# Patient Record
Sex: FEMALE | Race: WHITE | NOT HISPANIC OR LATINO | Employment: OTHER | URBAN - NONMETROPOLITAN AREA
[De-identification: names, ages, dates, MRNs, and addresses within clinical notes are randomized per-mention and may not be internally consistent; named-entity substitution may affect disease eponyms.]

---

## 2017-02-02 ENCOUNTER — OFFICE VISIT (OUTPATIENT)
Dept: CARDIOLOGY | Facility: CLINIC | Age: 77
End: 2017-02-02

## 2017-02-02 VITALS
BODY MASS INDEX: 21.05 KG/M2 | HEIGHT: 66 IN | SYSTOLIC BLOOD PRESSURE: 130 MMHG | DIASTOLIC BLOOD PRESSURE: 82 MMHG | WEIGHT: 131 LBS | HEART RATE: 52 BPM

## 2017-02-02 DIAGNOSIS — I73.9 CLAUDICATION OF BOTH LOWER EXTREMITIES (HCC): ICD-10-CM

## 2017-02-02 DIAGNOSIS — I25.10 CORONARY ARTERY DISEASE INVOLVING NATIVE CORONARY ARTERY OF NATIVE HEART WITHOUT ANGINA PECTORIS: Primary | ICD-10-CM

## 2017-02-02 DIAGNOSIS — R42 DIZZINESS: ICD-10-CM

## 2017-02-02 DIAGNOSIS — R09.89 BILATERAL CAROTID BRUITS: ICD-10-CM

## 2017-02-02 DIAGNOSIS — R20.0 NUMBNESS: ICD-10-CM

## 2017-02-02 DIAGNOSIS — I10 ESSENTIAL HYPERTENSION: ICD-10-CM

## 2017-02-02 DIAGNOSIS — Z79.899 MEDICATION MANAGEMENT: ICD-10-CM

## 2017-02-02 DIAGNOSIS — I73.9 PVD (PERIPHERAL VASCULAR DISEASE) (HCC): ICD-10-CM

## 2017-02-02 DIAGNOSIS — R68.89 ABNORMAL ANKLE BRACHIAL INDEX: ICD-10-CM

## 2017-02-02 DIAGNOSIS — I77.9 BILATERAL CAROTID ARTERY DISEASE (HCC): ICD-10-CM

## 2017-02-02 DIAGNOSIS — R20.9 COLD HANDS AND FEET: ICD-10-CM

## 2017-02-02 DIAGNOSIS — E78.00 HYPERCHOLESTEREMIA: ICD-10-CM

## 2017-02-02 PROCEDURE — 99213 OFFICE O/P EST LOW 20 MIN: CPT | Performed by: NURSE PRACTITIONER

## 2017-02-02 NOTE — PROGRESS NOTES
"Chief Complaint   Patient presents with   • Follow-up     Complaining of hands and feet turning purple and extremely cold. Went to podiatrist to get toenails clipped and podiatrist wanted her to have test to check blood flow in arms and legs. Wanted to check and see your recommendations first. Denies shortness of breath or palpitations. Doesn't need refills at this time. Labs per PCP in November. Had pneumonia at this time.    • Chest Pain     Occasional pressure. Thinks is related to indigestion because is releived with rolaid.        Subjective       Dinah Gaines is a 76 y.o. female history of ischemic heart disease diagnosed in 2011 when she underwent stenting of the right coronary artery. Her cardiac workup done in 2015 showed no evidence of ischemia. Today she returns and no new or worsening symptoms reported except cold hands and feet. She reports a \"circulation test\" scheduled for next week.     HPI         Cardiac History:    Past Surgical History   Procedure Laterality Date   • Tubal abdominal ligation     • Converted (historical) holter  07/18/2013     Holter- baseline sinus, lowest HR 43 bpm   • Converted (historical) holter  06/11/2014     Holter- borderline sinus, lowest HR 52bpm, frequent PVCs noted   • Cardiovascular stress test  05/07/2015     Stress 4 min 10 sec, 68% THR, 180/100, negative for ischemia   • Echo - converted  05/07/2015     Echo- EF 50-55%, RVSP 42 mmHg, mild MR   • Cardiac catheterization  04/04/2011     - 3.5x28 Promus in RCA. EF 45%       Current Outpatient Prescriptions   Medication Sig Dispense Refill   • citalopram (CeleXA) 20 MG tablet Take 20 mg by mouth daily.     • clonazePAM (KlonoPIN) 0.5 MG tablet Take 0.5 mg by mouth daily.     • cloNIDine (CATAPRES) 0.1 MG tablet Take 0.1 mg by mouth 2 (two) times a day.     • clopidogrel (PLAVIX) 75 MG tablet Take 75 mg by mouth daily.     • levothyroxine (SYNTHROID, LEVOTHROID) 75 MCG tablet Take 75 mcg by mouth daily.     • " lisinopril (PRINIVIL,ZESTRIL) 40 MG tablet TAKE ONE TABLET BY MOUTH EVERY DAY FOR BLOOD PRESSURE 90 tablet 3   • Multiple Vitamin (MULTI VITAMIN DAILY PO) Take  by mouth daily.     • Polyethylene Glycol 3350 granules daily.     • ranitidine (ZANTAC) 300 MG tablet TAKE ONE TABLET BY MOUTH AT BEDTIME 90 tablet 3   • simvastatin (ZOCOR) 40 MG tablet Take 40 mg by mouth every night.       No current facility-administered medications for this visit.        Review of patient's allergies indicates no known allergies.    Past Medical History   Diagnosis Date   • Abrasion of arm, left 09/2014   • Anxiety disorder    • Atherosclerotic heart disease    • CAD (coronary artery disease)      followed by Dr. Phelps for CAD 60% (R) internal and 3.8 ectasia of the ascending thoracic aorta.   • H/O tubal ligation    • History of MI (myocardial infarction) 2011     s/p Cardiac Cath s/p Stent placement x1 Dr. Alan   • Hyperlipidemia    • Hypertension    • Hypothyroidism    • Osteoarthritis    • Osteoporosis    • RLS (restless legs syndrome)        Social History     Social History   • Marital status:      Spouse name: N/A   • Number of children: N/A   • Years of education: N/A     Occupational History   • Not on file.     Social History Main Topics   • Smoking status: Former Smoker     Quit date: 2011   • Smokeless tobacco: Never Used   • Alcohol use No   • Drug use: No   • Sexual activity: Not on file     Other Topics Concern   • Not on file     Social History Narrative       Family History   Problem Relation Age of Onset   • No Known Problems Mother    • Asthma Father    • Heart attack Brother        Review of Systems   Constitutional: Negative for activity change, fatigue and fever.   HENT: Negative for congestion, nosebleeds and trouble swallowing.    Eyes: Negative for visual disturbance.   Respiratory: Positive for chest tightness (or slight pressure). Negative for cough, shortness of breath and wheezing.   "  Cardiovascular: Negative for chest pain.   Gastrointestinal: Negative for abdominal distention, abdominal pain, blood in stool, nausea (mild heartburn relieved with rolaid) and vomiting.   Endocrine: Negative for polydipsia, polyphagia and polyuria.   Genitourinary: Negative for difficulty urinating and hematuria.   Musculoskeletal: Negative for gait problem and myalgias.   Skin: Negative for color change.   Neurological: Positive for numbness. Negative for dizziness, tremors, syncope, facial asymmetry, speech difficulty, weakness, light-headedness and headaches.   Hematological: Bruises/bleeds easily.   Psychiatric/Behavioral: Negative for confusion and sleep disturbance. The patient is not nervous/anxious.        Diabetes- No  Thyroid-abnormal    Objective     Visit Vitals   • /82 (BP Location: Right arm)   • Pulse 52   • Ht 66\" (167.6 cm)   • Wt 131 lb (59.4 kg)   • BMI 21.14 kg/m2       Physical Exam   Constitutional: She is oriented to person, place, and time. She appears well-developed.   Eyes: Pupils are equal, round, and reactive to light.   Neck: Neck supple. No JVD present. Carotid bruit is present.   Cardiovascular: Normal rate, regular rhythm and S1 normal.    Murmur heard.   Systolic murmur is present with a grade of 2/6   Pulses:       Radial pulses are 2+ on the right side, and 2+ on the left side.   Slightly loud S2   Pulmonary/Chest: Effort normal and breath sounds normal.   Abdominal: Soft. Bowel sounds are normal.   Musculoskeletal: She exhibits no edema.   Neurological: She is alert and oriented to person, place, and time.   Skin: Skin is warm and dry.   Psychiatric: She has a normal mood and affect. Her behavior is normal. Judgment and thought content normal.   Vitals reviewed.        Procedures          Assessment/Plan      Dinah was seen today for follow-up and chest pain.    Diagnoses and all orders for this visit:    Coronary artery disease involving native coronary artery of native " "heart without angina pectoris    Essential hypertension    Hypercholesteremia    PVD (peripheral vascular disease)    Cold hands and feet      She has appointment to have \"circulation check\" of legs. At her next visit we will consider repeat carotid/subclavian ultrasound due to history of stenosis for which she had seen Dr. Phelps in the past. Currently, she denies any symptoms. Her vital signs are stable. No medication changes made. She follows with you for management of labs.            Electronically signed by JULITO Mittal,  February 2, 2017 12:34 PM  "

## 2017-02-16 ENCOUNTER — TELEPHONE (OUTPATIENT)
Dept: CARDIOLOGY | Facility: CLINIC | Age: 77
End: 2017-02-16

## 2017-02-16 DIAGNOSIS — R20.0 NUMBNESS: ICD-10-CM

## 2017-02-16 DIAGNOSIS — I73.9 CLAUDICATION OF BOTH LOWER EXTREMITIES (HCC): ICD-10-CM

## 2017-02-16 DIAGNOSIS — R68.89 ABNORMAL ANKLE BRACHIAL INDEX: ICD-10-CM

## 2017-02-16 DIAGNOSIS — Z79.899 MEDICATION MANAGEMENT: ICD-10-CM

## 2017-02-16 DIAGNOSIS — R09.89 BILATERAL CAROTID BRUITS: Primary | ICD-10-CM

## 2017-02-17 NOTE — TELEPHONE ENCOUNTER
I ordered BMP, CT of LE and carotid U/S  
Lets go ahead and do a CTA of pelvis and lower extremities. Also a carotid/subclavian ultrasound due to history of bypass. Also need BMP to assess renal function prior to CTA. If results are abnormal she needs to see Dr. Pehlps. If she agrees I will place orders.   
Patient brought copy of KILEY results. Patient wants you to review. In patient chart under media.   
Scheduled for March 1st 2017 at 3:00. Advised patient to be at hospital at 1:00 per [place]'s recommendations to get labs done first. Patient aware of appointment. Orders faxed.   
Spoke with patient. States she is willing to undergo CTA of pelvis and lower extremities and carotid/subclavian US.   
1

## 2017-03-06 ENCOUNTER — TELEPHONE (OUTPATIENT)
Dept: CARDIOLOGY | Facility: CLINIC | Age: 77
End: 2017-03-06

## 2017-03-06 DIAGNOSIS — R42 DIZZINESS: ICD-10-CM

## 2017-03-06 DIAGNOSIS — I73.9 PVD (PERIPHERAL VASCULAR DISEASE) WITH CLAUDICATION (HCC): Primary | ICD-10-CM

## 2017-03-06 DIAGNOSIS — I77.9 BILATERAL CAROTID ARTERY DISEASE (HCC): ICD-10-CM

## 2017-03-06 NOTE — TELEPHONE ENCOUNTER
Patient aware of recommendations and is willing to follow recommendations. Can you put in order for CTA of carotids and referral to Dr. Phelps? Thank you!

## 2017-03-06 NOTE — TELEPHONE ENCOUNTER
Patient called requesting results of US Carotid's and CTA results. They are scanned into patient's chart. Thank you.

## 2017-03-06 NOTE — TELEPHONE ENCOUNTER
Please inform of results. She appears to have about 60% carotid artery disease and may need CTA for confirmation. She appears to have stenosis of superficial femoral etc. I would recommend forward results to Dr. Phelps and make follow up appointment for her to see him for any further investigation and his treatment recommendations. Thanks.

## 2017-03-07 NOTE — TELEPHONE ENCOUNTER
Called to schedule appointment with Dr. Phelps and he is out of the [place] for the next few weeks but his partner, Dr. Heart, is available next week. Spoke with patient and she is fine with seeing Dr. Heart. Is this okay?

## 2017-03-07 NOTE — TELEPHONE ENCOUNTER
Appointment made for 03/14 at 2:15pm. Patient aware. Referral, office note, and CTA sent to Dr. Heart's office.

## 2017-03-28 ENCOUNTER — OUTSIDE FACILITY SERVICE (OUTPATIENT)
Dept: CARDIOLOGY | Facility: CLINIC | Age: 77
End: 2017-03-28

## 2017-03-28 PROCEDURE — 93306 TTE W/DOPPLER COMPLETE: CPT | Performed by: INTERNAL MEDICINE

## 2017-04-12 RX ORDER — CLONIDINE HYDROCHLORIDE 0.1 MG/1
TABLET ORAL
Qty: 180 TABLET | Refills: 3 | Status: SHIPPED | OUTPATIENT
Start: 2017-04-12 | End: 2017-11-28 | Stop reason: DRUGHIGH

## 2017-07-11 RX ORDER — LISINOPRIL 40 MG/1
TABLET ORAL
Qty: 90 TABLET | Refills: 0 | Status: SHIPPED | OUTPATIENT
Start: 2017-07-11 | End: 2017-11-28 | Stop reason: DRUGHIGH

## 2017-07-11 RX ORDER — RANITIDINE 300 MG/1
TABLET ORAL
Qty: 90 TABLET | Refills: 4 | OUTPATIENT
Start: 2017-07-11

## 2017-08-07 ENCOUNTER — OFFICE VISIT (OUTPATIENT)
Dept: CARDIOLOGY | Facility: CLINIC | Age: 77
End: 2017-08-07

## 2017-08-07 VITALS
SYSTOLIC BLOOD PRESSURE: 110 MMHG | BODY MASS INDEX: 21.05 KG/M2 | WEIGHT: 131 LBS | DIASTOLIC BLOOD PRESSURE: 72 MMHG | HEART RATE: 56 BPM | HEIGHT: 66 IN

## 2017-08-07 DIAGNOSIS — I73.9 PVD (PERIPHERAL VASCULAR DISEASE) (HCC): ICD-10-CM

## 2017-08-07 DIAGNOSIS — E78.00 HYPERCHOLESTEREMIA: ICD-10-CM

## 2017-08-07 DIAGNOSIS — I10 ESSENTIAL HYPERTENSION: ICD-10-CM

## 2017-08-07 DIAGNOSIS — R00.1 BRADYCARDIA: ICD-10-CM

## 2017-08-07 DIAGNOSIS — I65.21 STENOSIS OF RIGHT CAROTID ARTERY: ICD-10-CM

## 2017-08-07 DIAGNOSIS — I25.10 CORONARY ARTERY DISEASE INVOLVING NATIVE CORONARY ARTERY OF NATIVE HEART WITHOUT ANGINA PECTORIS: Primary | ICD-10-CM

## 2017-08-07 DIAGNOSIS — E03.9 ACQUIRED HYPOTHYROIDISM: ICD-10-CM

## 2017-08-07 DIAGNOSIS — R13.19 OTHER DYSPHAGIA: ICD-10-CM

## 2017-08-07 PROCEDURE — 99214 OFFICE O/P EST MOD 30 MIN: CPT | Performed by: NURSE PRACTITIONER

## 2017-08-07 NOTE — PROGRESS NOTES
"Chief Complaint   Patient presents with   • Follow-up     Denies chest pain or palpitations. Has had three stents in right leg. Had right CEA per Dr. Heart. Progress notes from Dr. Heart in patient chart. Says she is currently working with speech therapy for dysphagia. Doesn't need refills at this time. Labs per PCP about 4 months ago.    • Shortness of Breath     With exertion. Seems to be getting better.        Cardiac Complaints  none      Subjective   Dinah Gaines is a 77 y.o. female with a history of ischemic heart disease diagnosed in 2011 when she underwent stenting of the right coronary artery. Her cardiac workup done in 2015 showed no evidence of ischemia. At last visit she returned with no new or worsening symptoms but reported except cold hands and feet. She reported a \"circulation test\" scheduled by PCP. KILEY report was then brought to the office which was read as abnormal.  CTA of abdomen with runoff was then advised as well as carotid CTA.  CTA of the abdomen with runoff showed an 80% stenosis of right femoral which was then stented x 1 in the right iliac and angioplasty performed by Dr. Heart.  Her CTA of her neck showed about a significant 80% of LINWOOD and she then underwent CEA with patch.  She returns today for follow up and states while she was in the hospital she was having difficulty swallowing and speech therapy was consulted.  She is now on a thickened liquid diet and states both the therapy and thickened liquids has really helped and she is doing much better.  She will have a swallowing study at the end of her last 8 sessions.  She denies any chest pain or palpitations but does admit to some shortness of breath but reports it has improved. Recent echo done this spring showed no significant abnormalities and normal LV function.  She reports walking daily and is getting up to about 1/2 mile a day.           Cardiac History  Past Surgical History:   Procedure Laterality Date   • CARDIAC " CATHETERIZATION  04/04/2011    - 3.5x28 Promus in RCA. EF 45%   • CARDIOVASCULAR STRESS TEST  05/07/2015    Stress 4 min 10 sec, 68% THR, 180/100, negative for ischemia   • CAROTID ENDARTERECTOMY  03/31/2017    Right CEA with patch.   • CONVERTED (HISTORICAL) HOLTER  07/18/2013    Holter- baseline sinus, lowest HR 43 bpm   • CONVERTED (HISTORICAL) HOLTER  06/11/2014    Holter- borderline sinus, lowest HR 52bpm, frequent PVCs noted   • ECHO - CONVERTED  05/07/2015    Echo- EF 50-55%, RVSP 42 mmHg, mild MR   • ECHO - CONVERTED  03/28/2017    EF 60%, mild MR, RSVP 35   • OTHER SURGICAL HISTORY  03/01/2017    CTA with runoff:  3.6 cm AAA, right iliac 50-60%, R femoral 80% stenosis   • OTHER SURGICAL HISTORY  03/14/2017    CTA neck:  80% LINWOOD   • OTHER SURGICAL HISTORY  03/17/2017    Stent to right iliac and angioplasty   • TUBAL ABDOMINAL LIGATION     • US CAROTID UNILATERAL  03/01/2017    Conor:  60% LINWOOD       Current Outpatient Prescriptions   Medication Sig Dispense Refill   • citalopram (CeleXA) 20 MG tablet Take 20 mg by mouth daily.     • clonazePAM (KlonoPIN) 0.5 MG tablet Take 0.5 mg by mouth daily.     • CloNIDine (CATAPRES) 0.1 MG tablet TAKE ONE TABLET BY MOUTH TWO TIMES A  tablet 3   • clopidogrel (PLAVIX) 75 MG tablet Take 75 mg by mouth daily.     • levothyroxine (SYNTHROID, LEVOTHROID) 75 MCG tablet Take 75 mcg by mouth daily.     • lisinopril (PRINIVIL,ZESTRIL) 40 MG tablet TAKE ONE TABLET BY MOUTH EVERY DAY 90 tablet 0   • Multiple Vitamin (MULTI VITAMIN DAILY PO) Take  by mouth daily.     • Polyethylene Glycol 3350 granules daily.     • ranitidine (ZANTAC) 300 MG tablet TAKE ONE TABLET BY MOUTH AT BEDTIME 90 tablet 3   • simvastatin (ZOCOR) 40 MG tablet Take 40 mg by mouth every night.       No current facility-administered medications for this visit.        Review of patient's allergies indicates no known allergies.    Past Medical History:   Diagnosis Date   • Abrasion of arm, left 09/2014  "  • Anxiety disorder    • Atherosclerotic heart disease    • CAD (coronary artery disease)     followed by Dr. Phelps for CAD 60% (R) internal and 3.8 ectasia of the ascending thoracic aorta.   • H/O tubal ligation    • History of MI (myocardial infarction) 2011    s/p Cardiac Cath s/p Stent placement x1 Dr. Alan   • Hyperlipidemia    • Hypertension    • Hypothyroidism    • Osteoarthritis    • Osteoporosis    • RLS (restless legs syndrome)        Social History     Social History   • Marital status:      Spouse name: N/A   • Number of children: N/A   • Years of education: N/A     Occupational History   • Not on file.     Social History Main Topics   • Smoking status: Former Smoker     Quit date: 2011   • Smokeless tobacco: Never Used   • Alcohol use No   • Drug use: No   • Sexual activity: Not on file     Other Topics Concern   • Not on file     Social History Narrative       Family History   Problem Relation Age of Onset   • No Known Problems Mother    • Asthma Father    • Heart attack Brother        Review of Systems   Constitution: Negative for weakness and malaise/fatigue.   Cardiovascular: Negative for chest pain, dyspnea on exertion, irregular heartbeat and palpitations.   Respiratory: Positive for shortness of breath. Negative for wheezing.    Musculoskeletal: Negative for arthritis and back pain.   Gastrointestinal: Negative for anorexia, flatus and nausea.   Genitourinary: Negative for dysuria, hesitancy and nocturia.   Neurological: Negative for dizziness, focal weakness and light-headedness.   Psychiatric/Behavioral: Negative for altered mental status and depression.       DiabetesNo  Thyroidabnormal    Objective     /72  Pulse 56  Ht 66\" (167.6 cm)  Wt 131 lb (59.4 kg)  BMI 21.14 kg/m2    Physical Exam   Constitutional: She is oriented to person, place, and time. She appears well-developed and well-nourished.   HENT:   Head: Normocephalic and atraumatic.   Eyes: EOM are normal. " Pupils are equal, round, and reactive to light.   Neck: Normal range of motion. Neck supple.   Cardiovascular: Regular rhythm.  Bradycardia present.    Murmur heard.  Pulmonary/Chest: Effort normal and breath sounds normal.   Abdominal: Soft.   Musculoskeletal: Normal range of motion.   Neurological: She is alert and oriented to person, place, and time.   Skin: Skin is warm and dry.   Psychiatric: She has a normal mood and affect. Her behavior is normal.       Procedures    Assessment/Plan     HR is slightly bradycardic today which has been her normal for sometime.  She takes her clonidine daily which is most likely the cause.  BP is well controlled.  She denies any new concerns  and states she is now walking daily and has slowly been able to increase her activity level and shortness of breath has slowly improved.  She was encouraged to continue with gradually increasing her activity level.  She is going to continue with speech therapy as she does have dysphagia and has about 8 visits left and will then have repeat swallow study, patient encouraged to continue to have thickened liquids.  Labs with you, could we get next copy?  No refills are needed at present.  She was encouraged to continue use of plavix as no bleeding reported and she has recently undergone stenting of right iliac artery and CEA of right carotid with patch.  Good cardiac diet as well as good cholesterol control advised.  6 month follow up scheduled or sooner if needed.      Problems Addressed this Visit        Cardiovascular and Mediastinum    Coronary artery disease involving native coronary artery of native heart without angina pectoris - Primary    Essential hypertension    Hypercholesteremia    PVD (peripheral vascular disease)       Endocrine    Acquired hypothyroidism      Other Visit Diagnoses     Stenosis of right carotid artery        Bradycardia                  Electronically signed by JULITO Hill August 7, 2017 4:03  PM

## 2017-09-26 RX ORDER — RANITIDINE 300 MG/1
TABLET ORAL
Qty: 90 TABLET | Refills: 4 | OUTPATIENT
Start: 2017-09-26

## 2017-10-11 RX ORDER — RANITIDINE 300 MG/1
TABLET ORAL
Qty: 90 TABLET | Refills: 4 | OUTPATIENT
Start: 2017-10-11

## 2017-11-28 ENCOUNTER — OFFICE VISIT (OUTPATIENT)
Dept: CARDIOLOGY | Facility: CLINIC | Age: 77
End: 2017-11-28

## 2017-11-28 VITALS
HEART RATE: 64 BPM | DIASTOLIC BLOOD PRESSURE: 78 MMHG | SYSTOLIC BLOOD PRESSURE: 120 MMHG | WEIGHT: 132 LBS | HEIGHT: 66 IN | BODY MASS INDEX: 21.21 KG/M2

## 2017-11-28 DIAGNOSIS — I77.9 RIGHT-SIDED CAROTID ARTERY DISEASE (HCC): ICD-10-CM

## 2017-11-28 DIAGNOSIS — I73.9 PVD (PERIPHERAL VASCULAR DISEASE) (HCC): ICD-10-CM

## 2017-11-28 DIAGNOSIS — E78.00 HYPERCHOLESTEREMIA: ICD-10-CM

## 2017-11-28 DIAGNOSIS — D50.8 OTHER IRON DEFICIENCY ANEMIA: ICD-10-CM

## 2017-11-28 DIAGNOSIS — K26.4 GASTROINTESTINAL HEMORRHAGE ASSOCIATED WITH DUODENAL ULCER: ICD-10-CM

## 2017-11-28 DIAGNOSIS — I10 ESSENTIAL HYPERTENSION: ICD-10-CM

## 2017-11-28 DIAGNOSIS — I25.10 CORONARY ARTERY DISEASE INVOLVING NATIVE CORONARY ARTERY OF NATIVE HEART WITHOUT ANGINA PECTORIS: Primary | ICD-10-CM

## 2017-11-28 PROCEDURE — 99214 OFFICE O/P EST MOD 30 MIN: CPT | Performed by: NURSE PRACTITIONER

## 2017-11-28 RX ORDER — OMEPRAZOLE 40 MG/1
40 CAPSULE, DELAYED RELEASE ORAL 2 TIMES DAILY
COMMUNITY
End: 2018-01-02 | Stop reason: DRUGHIGH

## 2017-11-28 RX ORDER — LISINOPRIL 20 MG/1
20 TABLET ORAL DAILY
COMMUNITY
End: 2018-07-10

## 2017-11-28 RX ORDER — FERROUS SULFATE 325(65) MG
325 TABLET ORAL 2 TIMES DAILY
COMMUNITY
End: 2018-01-02 | Stop reason: ALTCHOICE

## 2017-11-28 RX ORDER — CLONIDINE HYDROCHLORIDE 0.1 MG/1
0.1 TABLET ORAL DAILY
COMMUNITY
End: 2018-01-02 | Stop reason: ALTCHOICE

## 2017-11-28 NOTE — PATIENT INSTRUCTIONS
Check blood pressure twice daily for one week and call with readings    Brandi Meeks, APRN  [phone]

## 2017-11-28 NOTE — PROGRESS NOTES
Chief Complaint   Patient presents with   • Follow-up     For cardiac management. PCP requested for patient to be seen. She reports was in hospital 3 weeks ago due to vomiting blood and dehydration, was noted with ulcer. She reports that she received 3 units of blood. She states a week ago was in ER due to low B/P, had medication adjustment.   • Shortness of Breath     She states started in April, is getting some better.       Subjective       Dinah Gaines is a 77 y.o. female with a history of ischemic heart disease diagnosed in 2011 when she underwent stenting of the right coronary artery.  Stress test done in 2015 showed no evidence of ischemia.  Echocardiogram done in March 2017 showed normal LV function and only mild MR.  Earlier in the year, she was having a problem with cold hands and feet.  She underwent KILEY which was abnormal, and CTA of abdomen with runoff showed 80% stenosis of right femoral which was stented by Dr. Heart.  CTA of her neck showed 80% lesion of LINWOOD for which she underwent (R)CEA with patch.  She was having a problem with swallowing reported several months ago, underwent barium swallow and had been thickening her liquids and doing well with that.  She saw Dr. Colindres a few weeks ago and underwent EGD and esophageal stretching without complications.  Apparently two weeks later she developed some UTI and bronchitis and went through two rounds of antibiotics.  She developed nausea, vomiting, noticed dark and bloody emesis and presented to the ER.  Hemoglobin was stable but while hospitalized she developed significant melena and became profoundly anemic.  EGD confirmed 5 cm duodenal ulceration.  She was transfused, given IV PPI then was sent home.  Plavix was continued along with PPI increased to twice daily.  She returned to the ER two weeks later after having a syncopal episode after getting up from sleeping and walking to bathroom.  She felt disoriented and dizzy, fell into the bathtub  hitting only her arm.  After regaining consciousness, blood pressure was checked at 71/51.  She called her [facility] nurse who advised her to go to the ER.  Labs were stable, hemoglobin 9.7, she was given fluids, and was sent home with diagnosis of vasovagal.  She saw Dr. Allen who decreased clonidine as well as lisinopril.  She came in today to be evaluated from a cardiac standpoint.  She is feeling much better now considering.  She has had no more hematemesis or melena.  Hemoglobin rechecked yesterday was improved at 10.0.  She is having no chest pain or shortness of breath.  Overall, she feels much better.  She is checking her blood pressure which has been low to normal.       HPI         Cardiac History:    Past Surgical History:   Procedure Laterality Date   • CARDIAC CATHETERIZATION  04/04/2011    - 3.5x28 Promus in RCA. EF 45%   • CARDIOVASCULAR STRESS TEST  05/07/2015    Stress 4 min 10 sec, 68% THR, 180/100, negative for ischemia   • CAROTID ENDARTERECTOMY  03/31/2017    Right CEA with patch.   • CONVERTED (HISTORICAL) HOLTER  07/18/2013    Holter- baseline sinus, lowest HR 43 bpm   • CONVERTED (HISTORICAL) HOLTER  06/11/2014    Holter- borderline sinus, lowest HR 52bpm, frequent PVCs noted   • ECHO - CONVERTED  05/07/2015    Echo- EF 50-55%, RVSP 42 mmHg, mild MR   • ECHO - CONVERTED  03/28/2017    EF 60%, mild MR, RSVP 35   • OTHER SURGICAL HISTORY  03/01/2017    CTA with runoff:  3.6 cm AAA, right iliac 50-60%, R femoral 80% stenosis   • OTHER SURGICAL HISTORY  03/14/2017    CTA neck:  80% LINWOOD   • OTHER SURGICAL HISTORY  03/17/2017    Stent to right iliac and angioplasty   • US CAROTID UNILATERAL  03/01/2017    Conor:  60% LINWOOD       Current Outpatient Prescriptions   Medication Sig Dispense Refill   • citalopram (CeleXA) 20 MG tablet Take 20 mg by mouth daily.     • clonazePAM (KlonoPIN) 0.5 MG tablet Take 0.5 mg by mouth daily.     • CloNIDine (CATAPRES) 0.1 MG tablet Take 0.1 mg by mouth Daily.      • ferrous sulfate 325 (65 FE) MG tablet Take 325 mg by mouth 2 (Two) Times a Day.     • levothyroxine (SYNTHROID, LEVOTHROID) 75 MCG tablet Take 75 mcg by mouth daily.     • lisinopril (PRINIVIL,ZESTRIL) 20 MG tablet Take 20 mg by mouth Daily.     • Multiple Vitamin (MULTI VITAMIN DAILY PO) Take  by mouth daily.     • omeprazole (priLOSEC) 40 MG capsule Take 40 mg by mouth 2 (Two) Times a Day.     • Polyethylene Glycol 3350 granules Take  by mouth Daily As Needed.     • simvastatin (ZOCOR) 40 MG tablet Take 40 mg by mouth every night.       No current facility-administered medications for this visit.        Review of patient's allergies indicates no known allergies.    Past Medical History:   Diagnosis Date   • Abrasion of arm, left 09/2014   • Anemia    • Anxiety disorder    • Atherosclerotic heart disease    • Bleeding ulcer    • CAD (coronary artery disease)     followed by Dr. Phelps for CAD 60% (R) internal and 3.8 ectasia of the ascending thoracic aorta.   • H/O tubal ligation    • History of MI (myocardial infarction) 2011    s/p Cardiac Cath s/p Stent placement x1 Dr. Alan   • Hyperlipidemia    • Hypertension    • Hypothyroidism    • Osteoarthritis    • Osteoporosis    • RLS (restless legs syndrome)        Social History     Social History   • Marital status:      Spouse name: N/A   • Number of children: N/A   • Years of education: N/A     Occupational History   • Not on file.     Social History Main Topics   • Smoking status: Former Smoker     Quit date: 2011   • Smokeless tobacco: Never Used   • Alcohol use No   • Drug use: No   • Sexual activity: Not on file     Other Topics Concern   • Not on file     Social History Narrative       Family History   Problem Relation Age of Onset   • No Known Problems Mother    • Asthma Father    • Heart attack Brother        Review of Systems   Constitution: Positive for weakness (improving ). Negative for decreased appetite.   HENT: Negative for congestion.  "   Eyes: Negative for blurred vision.   Cardiovascular: Positive for syncope (one episode, none after visit to ER ). Negative for chest pain, dyspnea on exertion, leg swelling and palpitations.   Respiratory: Positive for shortness of breath (improved). Negative for cough.    Hematologic/Lymphatic: Positive for bleeding problem (recent GI bleeding, none recent ).   Skin: Negative for color change.   Musculoskeletal: Positive for falls.   Gastrointestinal: Negative for abdominal pain.   Genitourinary:        She does have some kind of bladder problem followed by urology    Neurological: Negative for dizziness (none present today ).   Psychiatric/Behavioral: Negative for altered mental status.      Diabetes- No  Thyroid-unavailable     Objective     /78 (BP Location: Left arm)  Pulse 64  Ht 66\" (167.6 cm)  Wt 132 lb (59.9 kg)  BMI 21.31 kg/m2    Physical Exam   Constitutional: She is oriented to person, place, and time. She appears well-developed and well-nourished.   HENT:   Head: Normocephalic.   Eyes: Pupils are equal, round, and reactive to light.   Neck: Normal range of motion.   Cardiovascular: Normal rate, regular rhythm and intact distal pulses.    Murmur heard.  Good dorsalis pedis pulse bilaterally    Pulmonary/Chest: Effort normal and breath sounds normal. No respiratory distress.   Abdominal: Soft. Bowel sounds are normal. She exhibits no distension. There is no tenderness. There is no guarding.   Musculoskeletal: Normal range of motion. She exhibits no edema.   Neurological: She is alert and oriented to person, place, and time.   Skin: Skin is warm and dry. No pallor.   Psychiatric: She has a normal mood and affect.      Procedures          Assessment/Plan    Heart rate and blood pressure stable today.  We discussed her recent hospitalization as well as ER visit in detail.  She is checking her blood pressure which has been low to normal 110-120 systolic.  We can try holding the clonidine and " continue lisinopril 20 mg daily.  She is advised to check blood pressure twice a day for the next week and call with results.  If blood pressure remains <140/90, we can continue with lisinopril only.  If blood pressure goes up after stopping clonidine, she may do better with increasing lisinopril back to 40 mg.  Regarding the Plavix, this was discussed with Dr. Franks who advised to stop for now.  Her peripheral stenting was eight months ago and her risk for GI bleeding is high.  She was informed to discontinue this.  She was noted to have 3.6 cm infrarenal aneurysm which can be monitored annually with ultrasound.  Labs have been monitored by Dr. Allen.  No refills needed today.  We will adjust medications based on her blood pressure response.  She is advised to watch for signs of bleeding and report promptly.  We will see her back in six months or sooner if needed.      Dinah was seen today for follow-up and shortness of breath.    Diagnoses and all orders for this visit:    Coronary artery disease involving native coronary artery of native heart without angina pectoris    PVD (peripheral vascular disease)  Comments:  s/p stenting (R) iliac artery 3/2017    Right-sided carotid artery disease    Essential hypertension    Hypercholesteremia    Other iron deficiency anemia    Gastrointestinal hemorrhage associated with duodenal ulcer                    Electronically signed by JULITO Kaplan,  November 29, 2017 5:25 PM

## 2017-12-12 ENCOUNTER — TELEPHONE (OUTPATIENT)
Dept: CARDIOLOGY | Facility: CLINIC | Age: 77
End: 2017-12-12

## 2017-12-12 NOTE — TELEPHONE ENCOUNTER
Patient came into office reporting had episode yesterday states had went to bathroom while at work and broke out in sweat felt very hot and afterwards felt very weak, patient  Concerned may be related to her heart. States at most recent visit clonidine and plavix was stopped and b/p medication was decreased. Patient brought list of b/p readings. What are your recommendation's?    118/64 pulse 74  107/73 pulse 83  134/75 p 69  121/79 p 75  113/66 p 74

## 2017-12-13 NOTE — TELEPHONE ENCOUNTER
Her blood pressures look good.  She was having a lot of other issues with GI bleeding and low blood pressure at her last visit.      Her last stress test was in 2015 which may need to be repeated.  She does have history of stenting.  We can order the stress or she can come in for a visit to discuss.  Can you ask her if she has had any more bleeding?

## 2017-12-13 NOTE — TELEPHONE ENCOUNTER
Called patient with recommendation's, patient request appointment after January 1st. Will get patient appointment Scheduled and call her back with appointment date and time.

## 2018-01-02 ENCOUNTER — OFFICE VISIT (OUTPATIENT)
Dept: CARDIOLOGY | Facility: CLINIC | Age: 78
End: 2018-01-02

## 2018-01-02 VITALS
BODY MASS INDEX: 21.69 KG/M2 | HEIGHT: 66 IN | DIASTOLIC BLOOD PRESSURE: 80 MMHG | WEIGHT: 135 LBS | SYSTOLIC BLOOD PRESSURE: 120 MMHG | HEART RATE: 72 BPM

## 2018-01-02 DIAGNOSIS — I25.10 CORONARY ARTERY DISEASE INVOLVING NATIVE CORONARY ARTERY OF NATIVE HEART WITHOUT ANGINA PECTORIS: Primary | ICD-10-CM

## 2018-01-02 DIAGNOSIS — I73.9 PVD (PERIPHERAL VASCULAR DISEASE) (HCC): ICD-10-CM

## 2018-01-02 DIAGNOSIS — I10 ESSENTIAL HYPERTENSION: ICD-10-CM

## 2018-01-02 DIAGNOSIS — I71.40 ABDOMINAL AORTIC ANEURYSM (AAA) WITHOUT RUPTURE (HCC): ICD-10-CM

## 2018-01-02 DIAGNOSIS — E78.00 HYPERCHOLESTEREMIA: ICD-10-CM

## 2018-01-02 PROCEDURE — 99213 OFFICE O/P EST LOW 20 MIN: CPT | Performed by: NURSE PRACTITIONER

## 2018-01-02 RX ORDER — CLONAZEPAM 1 MG/1
1 TABLET ORAL DAILY
COMMUNITY

## 2018-01-02 RX ORDER — OMEPRAZOLE 40 MG/1
40 CAPSULE, DELAYED RELEASE ORAL DAILY
COMMUNITY
End: 2020-07-23 | Stop reason: ALTCHOICE

## 2018-01-02 RX ORDER — NITROGLYCERIN 0.4 MG/1
TABLET SUBLINGUAL
Qty: 25 TABLET | Refills: 1 | Status: SHIPPED | OUTPATIENT
Start: 2018-01-02 | End: 2021-03-08 | Stop reason: SDUPTHER

## 2018-01-02 NOTE — PROGRESS NOTES
Chief Complaint   Patient presents with   • Follow-up     For cardiac management. Had last lab work done about a month ago per PCP, not in chart.    • Med Refill     Does not need refills.        Subjective       Dinah Gaines is a 77 y.o. female with a history of ischemic heart disease diagnosed in 2011 when she underwent stenting of the right coronary artery.  Stress test done in 2015 showed no evidence of ischemia.  Echocardiogram done in March 2017 showed normal LV function and only mild MR.  Earlier in the year, she was having a problem with cold hands and feet.  She underwent KILEY which was abnormal, and CTA of abdomen with runoff showed 80% stenosis of right femoral which was stented by Dr. Heart.  CTA of her neck showed 80% lesion of LINWOOD for which she underwent (R)CEA with patch.  She developed GI bleeding in November 2017 and was hospitalized found to have duodenal ulcer treated with IV PPI.  Dr. Franks recommended stopping Plavix since her GI bleeding risk was high and stenting was more than 6 months prior.  She was incidentally noted to have a 3.6 cm infrarenal AAA.  Soon after discharge she had a syncopal episode and was noted to be hypotensive.  Clonidine and lisinopril were decreased, then clonidine was stopped.  She monitored blood pressure closely which was well controlled.  She called the office on 12/12/17 with weakness and diaphoresis.  She was concerned her symptoms were cardiac and was advised to come in for evaluation.  She wanted to wait until after the holidays, and is here now for evaluation.  She feels well and has had no more symptoms.  She denies chest pain, shortness of breath, dizziness, weakness, diaphoresis.  She remains active and independent.  She has had no more bleeding.  Labs were repeated this morning with Dr. Allen.  She is also followed by Dr. Phelps for carotid artery disease and saw him last week.     HPI         Cardiac History:    Past Surgical History:   Procedure  Laterality Date   • CARDIAC CATHETERIZATION  04/04/2011    - 3.5x28 Promus in RCA. EF 45%   • CARDIOVASCULAR STRESS TEST  05/07/2015    Stress 4 min 10 sec, 68% THR, 180/100, negative for ischemia   • CAROTID ENDARTERECTOMY  03/31/2017    Right CEA with patch.   • CONVERTED (HISTORICAL) HOLTER  07/18/2013    Holter- baseline sinus, lowest HR 43 bpm   • CONVERTED (HISTORICAL) HOLTER  06/11/2014    Holter- borderline sinus, lowest HR 52bpm, frequent PVCs noted   • ECHO - CONVERTED  05/07/2015    Echo- EF 50-55%, RVSP 42 mmHg, mild MR   • ECHO - CONVERTED  03/28/2017    EF 60%, mild MR, RSVP 35   • OTHER SURGICAL HISTORY  03/01/2017    CTA with runoff:  3.6 cm AAA, right iliac 50-60%, R femoral 80% stenosis   • OTHER SURGICAL HISTORY  03/14/2017    CTA neck:  80% LINWOOD   • OTHER SURGICAL HISTORY  03/17/2017    Stent to right iliac and angioplasty   • US CAROTID UNILATERAL  03/01/2017    Conor:  60% LINWOOD       Current Outpatient Prescriptions   Medication Sig Dispense Refill   • citalopram (CeleXA) 20 MG tablet Take 20 mg by mouth daily.     • clonazePAM (KlonoPIN) 1 MG tablet Take 1 mg by mouth Daily.     • levothyroxine (SYNTHROID, LEVOTHROID) 75 MCG tablet Take 75 mcg by mouth daily.     • lisinopril (PRINIVIL,ZESTRIL) 20 MG tablet Take 20 mg by mouth Daily.     • Multiple Vitamin (MULTI VITAMIN DAILY PO) Take  by mouth daily.     • omeprazole (priLOSEC) 40 MG capsule Take 40 mg by mouth Daily.     • Polyethylene Glycol 3350 granules Take  by mouth Daily As Needed.     • simvastatin (ZOCOR) 40 MG tablet Take 40 mg by mouth every night.     • nitroglycerin (NITROSTAT) 0.4 MG SL tablet 1 under the tongue as needed for angina, may repeat q5mins for up three doses 25 tablet 1     No current facility-administered medications for this visit.        Review of patient's allergies indicates no known allergies.    Past Medical History:   Diagnosis Date   • Abrasion of arm, left 09/2014   • Anemia    • Anxiety disorder    •  "Atherosclerotic heart disease    • Bleeding ulcer    • CAD (coronary artery disease)     followed by Dr. Phelps for CAD 60% (R) internal and 3.8 ectasia of the ascending thoracic aorta.   • H/O tubal ligation    • History of MI (myocardial infarction) 2011    s/p Cardiac Cath s/p Stent placement x1 Dr. Alan   • Hyperlipidemia    • Hypertension    • Hypothyroidism    • Osteoarthritis    • Osteoporosis    • RLS (restless legs syndrome)        Social History     Social History   • Marital status:      Spouse name: N/A   • Number of children: N/A   • Years of education: N/A     Occupational History   • Not on file.     Social History Main Topics   • Smoking status: Former Smoker     Quit date: 2011   • Smokeless tobacco: Never Used   • Alcohol use No   • Drug use: No   • Sexual activity: Not on file     Other Topics Concern   • Not on file     Social History Narrative       Family History   Problem Relation Age of Onset   • No Known Problems Mother    • Asthma Father    • Heart attack Brother        Review of Systems   Constitution: Negative for weakness and malaise/fatigue.   HENT: Negative.    Eyes: Negative.    Cardiovascular: Positive for near-syncope (none recent ). Negative for chest pain, claudication and leg swelling.   Respiratory: Negative for cough and shortness of breath.    Endocrine: Negative.    Hematologic/Lymphatic: Negative for bleeding problem.   Skin: Negative.    Musculoskeletal: Negative.    Gastrointestinal: Negative for abdominal pain, hematemesis, hematochezia and melena.   Genitourinary: Negative.    Neurological: Negative.    Psychiatric/Behavioral: Negative for altered mental status and depression.   Allergic/Immunologic: Negative.         Diabetes- No  Thyroid-normal per pt    Objective     /80 (BP Location: Left arm)  Pulse 72  Ht 167.6 cm (65.98\")  Wt 61.2 kg (135 lb)  BMI 21.8 kg/m2    Physical Exam   Constitutional: She is oriented to person, place, and time. She " appears well-developed and well-nourished.   HENT:   Head: Normocephalic and atraumatic.   Eyes: Pupils are equal, round, and reactive to light.   Neck: Normal range of motion.   Cardiovascular: Normal rate, regular rhythm and intact distal pulses.  Exam reveals decreased pulses.    Murmur heard.  Pulses:       Dorsalis pedis pulses are 1+ on the right side, and 1+ on the left side.   Pulmonary/Chest: Effort normal and breath sounds normal.   Abdominal: Soft. Bowel sounds are normal.   Musculoskeletal: Normal range of motion. She exhibits no edema.   Neurological: She is alert and oriented to person, place, and time. No cranial nerve deficit.   Skin: Skin is warm and dry.   Psychiatric: She has a normal mood and affect.      Procedures          Assessment/Plan    Heart rate and blood pressure stable.  We have reviewed her blood pressure readings which appear stable.  Continue current medications.  We discussed her symptoms in detail.  She now feels the weakness and diaphoresis were related to her low blood pressure and does not wish to proceed with further work up.  Repeat stress test recommended with her history, but she prefers to avoid this if possible.  She was given a prescription for sl nitro 0.4 mg and instructed on how to use if she has any concerning chest discomfort.  We will re-evaluate the AAA by ultrasound at her next visit.  She was advised to report any significant abdominal or back pain.  Labs have been managed at your office.  We will see her back in six months or sooner if any new or worsening symptoms develop.      Dinah was seen today for follow-up and med refill.    Diagnoses and all orders for this visit:    Coronary artery disease involving native coronary artery of native heart without angina pectoris    Essential hypertension    Hypercholesteremia    PVD (peripheral vascular disease)    Abdominal aortic aneurysm (AAA) without rupture    Other orders  -     nitroglycerin (NITROSTAT) 0.4 MG  SL tablet; 1 under the tongue as needed for angina, may repeat q5mins for up three doses                  Electronically signed by JULITO Kaplan,  January 2, 2018 2:03 PM

## 2018-03-01 RX ORDER — LISINOPRIL 40 MG/1
TABLET ORAL
Qty: 90 TABLET | Refills: 0 | OUTPATIENT
Start: 2018-03-01

## 2018-03-05 RX ORDER — LISINOPRIL 40 MG/1
TABLET ORAL
Qty: 90 TABLET | Refills: 0 | OUTPATIENT
Start: 2018-03-05

## 2018-05-29 RX ORDER — LISINOPRIL 40 MG/1
TABLET ORAL
Qty: 90 TABLET | Refills: 0 | OUTPATIENT
Start: 2018-05-29

## 2018-07-10 ENCOUNTER — OFFICE VISIT (OUTPATIENT)
Dept: CARDIOLOGY | Facility: CLINIC | Age: 78
End: 2018-07-10

## 2018-07-10 VITALS
HEIGHT: 66 IN | HEART RATE: 68 BPM | DIASTOLIC BLOOD PRESSURE: 102 MMHG | WEIGHT: 139 LBS | BODY MASS INDEX: 22.34 KG/M2 | SYSTOLIC BLOOD PRESSURE: 170 MMHG

## 2018-07-10 DIAGNOSIS — I71.40 AAA (ABDOMINAL AORTIC ANEURYSM) WITHOUT RUPTURE (HCC): ICD-10-CM

## 2018-07-10 DIAGNOSIS — I10 ESSENTIAL HYPERTENSION: Primary | ICD-10-CM

## 2018-07-10 DIAGNOSIS — I73.9 PVD (PERIPHERAL VASCULAR DISEASE) (HCC): ICD-10-CM

## 2018-07-10 DIAGNOSIS — I25.10 CORONARY ARTERY DISEASE INVOLVING NATIVE CORONARY ARTERY OF NATIVE HEART WITHOUT ANGINA PECTORIS: ICD-10-CM

## 2018-07-10 DIAGNOSIS — E78.00 HYPERCHOLESTEREMIA: ICD-10-CM

## 2018-07-10 DIAGNOSIS — Z98.890 HISTORY OF CAROTID ENDARTERECTOMY: ICD-10-CM

## 2018-07-10 PROCEDURE — 99214 OFFICE O/P EST MOD 30 MIN: CPT | Performed by: NURSE PRACTITIONER

## 2018-07-10 RX ORDER — LISINOPRIL 20 MG/1
TABLET ORAL
Qty: 30 TABLET | Refills: 11 | Status: SHIPPED | OUTPATIENT
Start: 2018-07-10 | End: 2019-01-14 | Stop reason: DRUGHIGH

## 2018-07-10 NOTE — PROGRESS NOTES
Chief Complaint   Patient presents with   • Follow-up     For cardiac management. Needs refills on cardiac meds for 90 days to [facility]. Unsure of the last time she had labs checked.        Subjective       Dinah Gaines is a 78 y.o. female  with a history of ischemic heart disease diagnosed in 2011 when she underwent stenting of the right coronary artery.  Stress test done in 2015 showed no evidence of ischemia.  Echocardiogram done in March 2017 showed normal LV function and only mild MR.  In 2017, she was having a problem with cold hands and feet.  She underwent KILEY which was abnormal, and CTA of abdomen with runoff showed 80% stenosis of right femoral which was stented by Dr. Heart.  CTA of her neck showed 80% lesion of LINWOOD for which she underwent (R)CEA with patch.  She developed GI bleeding in November 2017 and was hospitalized found to have duodenal ulcer treated with IV PPI.  Dr. Franks recommended stopping Plavix since her GI bleeding risk was high and stenting was more than 6 months prior.  She was incidentally noted to have a 3.6 cm infrarenal AAA.  Soon after discharge she had a syncopal episode and was noted to be hypotensive.  Clonidine and lisinopril were decreased, then clonidine was stopped.  She monitored blood pressure closely which was well controlled.  She called the office on 12/12/17 with weakness and diaphoresis.  She was concerned her symptoms were cardiac and was advised to come in for evaluation.  She wanted to wait until after the holidays. She was then seen 1/2/18. She reported feeling well and no more symptoms.  Today she comes to the office for a follow up visit. She denies chest pain or palpitations. She denies issues with shortness of breath. She admits to mild headaches recently. When reviewing medications, it is noted she is not taking Lisinopril routinely, sometimes taking 1/2 tablet daily.     HPI     Cardiac History:    Past Surgical History:   Procedure  Laterality Date   • CARDIAC CATHETERIZATION  04/04/2011    - 3.5x28 Promus in RCA. EF 45%   • CARDIOVASCULAR STRESS TEST  05/07/2015    Stress 4 min 10 sec, 68% THR, 180/100, negative for ischemia   • CAROTID ENDARTERECTOMY  03/31/2017    Right CEA with patch.   • CONVERTED (HISTORICAL) HOLTER  07/18/2013    Holter- baseline sinus, lowest HR 43 bpm   • CONVERTED (HISTORICAL) HOLTER  06/11/2014    Holter- borderline sinus, lowest HR 52bpm, frequent PVCs noted   • ECHO - CONVERTED  05/07/2015    Echo- EF 50-55%, RVSP 42 mmHg, mild MR   • ECHO - CONVERTED  03/28/2017    EF 60%, mild MR, RSVP 35   • OTHER SURGICAL HISTORY  03/01/2017    CTA with runoff:  3.6 cm AAA, right iliac 50-60%, R femoral 80% stenosis   • OTHER SURGICAL HISTORY  03/14/2017    CTA neck:  80% LINWOOD   • OTHER SURGICAL HISTORY  03/17/2017    Stent to right iliac and angioplasty   • US CAROTID UNILATERAL  03/01/2017    Conor:  60% LINWOOD       Current Outpatient Prescriptions   Medication Sig Dispense Refill   • citalopram (CeleXA) 20 MG tablet Take 20 mg by mouth. Takes 1/2 tab     • clonazePAM (KlonoPIN) 1 MG tablet Take 1 mg by mouth Daily.     • levothyroxine (SYNTHROID, LEVOTHROID) 75 MCG tablet Take 75 mcg by mouth daily.     • Multiple Vitamin (MULTI VITAMIN DAILY PO) Take  by mouth daily.     • nitroglycerin (NITROSTAT) 0.4 MG SL tablet 1 under the tongue as needed for angina, may repeat q5mins for up three doses 25 tablet 1   • omeprazole (priLOSEC) 40 MG capsule Take 40 mg by mouth Daily.     • Polyethylene Glycol 3350 granules Take  by mouth Daily As Needed.     • simvastatin (ZOCOR) 40 MG tablet Take 40 mg by mouth every night.     • lisinopril (PRINIVIL,ZESTRIL) 20 MG tablet Take 1/2 tablet in morning and 1/2 tablet at night 30 tablet 11     No current facility-administered medications for this visit.        Patient has no known allergies.    Past Medical History:   Diagnosis Date   • Abrasion of arm, left 09/2014   • Anemia    • Anxiety  disorder    • Atherosclerotic heart disease    • Bleeding ulcer    • CAD (coronary artery disease)     followed by Dr. Phelps for CAD 60% (R) internal and 3.8 ectasia of the ascending thoracic aorta.   • H/O tubal ligation    • History of MI (myocardial infarction) 2011    s/p Cardiac Cath s/p Stent placement x1 Dr. Alan   • Hyperlipidemia    • Hypertension    • Hypothyroidism    • Osteoarthritis    • Osteoporosis    • RLS (restless legs syndrome)        Social History     Social History   • Marital status:      Spouse name: N/A   • Number of children: N/A   • Years of education: N/A     Occupational History   • Not on file.     Social History Main Topics   • Smoking status: Former Smoker     Quit date: 2011   • Smokeless tobacco: Never Used   • Alcohol use No   • Drug use: No   • Sexual activity: Not on file     Other Topics Concern   • Not on file     Social History Narrative   • No narrative on file       Family History   Problem Relation Age of Onset   • No Known Problems Mother    • Asthma Father    • Heart attack Brother        Review of Systems   Constitution: Negative for decreased appetite and weakness.   HENT: Negative for congestion and nosebleeds.    Eyes: Negative for redness and visual disturbance.   Cardiovascular: Negative for chest pain, leg swelling and palpitations.   Respiratory: Negative for cough, shortness of breath and sleep disturbances due to breathing.    Endocrine: Negative for polydipsia, polyphagia and polyuria.   Hematologic/Lymphatic: Negative for bleeding problem. Bruises/bleeds easily.   Skin: Negative for color change, dry skin and itching.   Musculoskeletal: Negative for muscle weakness and myalgias.   Gastrointestinal: Negative for abdominal pain, change in bowel habit and melena.   Genitourinary: Negative for dysuria and hematuria.   Neurological: Positive for headaches. Negative for dizziness.   Psychiatric/Behavioral: Negative for memory loss. The patient does not  "have insomnia and is not nervous/anxious.         Objective     BP (!) 170/102   Pulse 68   Ht 167.6 cm (66\")   Wt 63 kg (139 lb)   BMI 22.44 kg/m²     Physical Exam   Constitutional: She is oriented to person, place, and time. She appears well-nourished. She does not appear ill. No distress.   HENT:   Head: Normocephalic.   Eyes: Conjunctivae are normal. Pupils are equal, round, and reactive to light.   Neck: Normal range of motion. Neck supple. Carotid bruit is not present. No edema present.   Cardiovascular: Normal rate, regular rhythm and S1 normal.    Murmur heard.   Systolic murmur is present with a grade of 2/6   Pulses:       Radial pulses are 2+ on the right side, and 2+ on the left side.        Dorsalis pedis pulses are 1+ on the right side, and 1+ on the left side.   Loud S2   Pulmonary/Chest: Effort normal and breath sounds normal. She has no wheezes. She has no rales.   Abdominal: Soft. Bowel sounds are normal. She exhibits no distension. There is no tenderness.   Musculoskeletal: Normal range of motion. She exhibits no edema.   Neurological: She is alert and oriented to person, place, and time.   Skin: Skin is warm and dry. No pallor.   Psychiatric: She has a normal mood and affect. Her behavior is normal.   Vitals reviewed.       Procedures: none today        Assessment/Plan      Dinah was seen today for follow-up.    Diagnoses and all orders for this visit:    Essential hypertension    AAA (abdominal aortic aneurysm) without rupture (CMS/[facility])  -      Aorta Limited    Hypercholesteremia    Coronary artery disease involving native coronary artery of native heart without angina pectoris    PVD (peripheral vascular disease) (CMS/[facility])    History of carotid endarterectomy    Other orders  -     lisinopril (PRINIVIL,ZESTRIL) 20 MG tablet; Take 1/2 tablet in morning and 1/2 tablet at night        Patient's Body mass index is 22.44 kg/m². BMI is within normal parameters. No follow-up required. Heart " healthy diet encouraged. DASH diet encouraged.     For PVD, encourage daily walks. She is not on aspirin therapy due to history of GI bleed. If bleeding now lower, consider adding low dose aspirin.      Her blood pressure is increased today. Rechecked 170/92. Advised to increase Lisinopirl to 1/2 tablet in morning and 1/2 at night. She will continue to monitor blood pressure at home. She understands to call if remains > 130 systolic.     To reassess AAA an ultrasound ordered. At next visit consider carotid ultrasound due to history CEA.     For management of labs she follows with you. She continues statin therapy in form of simvastatin. If LDL not at goal consider adding Zetia.      From a cardiac standpoint, Ms. Gaines denies symptoms or concerns. I did not order cardiac testing today.      A 6 month follow up scheduled.            Electronically signed by JULITO Mittal,  July 13, 2018 11:20 AM

## 2018-07-13 PROBLEM — Z98.890 HISTORY OF CAROTID ENDARTERECTOMY: Status: ACTIVE | Noted: 2018-07-13

## 2018-08-13 ENCOUNTER — TELEPHONE (OUTPATIENT)
Dept: CARDIOLOGY | Facility: CLINIC | Age: 78
End: 2018-08-13

## 2018-08-13 NOTE — TELEPHONE ENCOUNTER
Patient called asking for appointment today. Reports that she has edema and discoloration in the RLE that started about a week ago. Had stenting to the R femoral and iliac arteries last year per Dr. Heart. Reports that extremity is warm to touch. Any recommendations?    [phone]

## 2018-08-13 NOTE — TELEPHONE ENCOUNTER
I have a full schedule today. If some one else can see if fine. Or is she wants me to schedule ultrasound of leg, we can do that then see her in follow-up.

## 2018-08-14 ENCOUNTER — OFFICE VISIT (OUTPATIENT)
Dept: CARDIOLOGY | Facility: CLINIC | Age: 78
End: 2018-08-14

## 2018-08-14 VITALS
DIASTOLIC BLOOD PRESSURE: 82 MMHG | HEART RATE: 60 BPM | HEIGHT: 66 IN | BODY MASS INDEX: 22.34 KG/M2 | WEIGHT: 139 LBS | SYSTOLIC BLOOD PRESSURE: 120 MMHG

## 2018-08-14 DIAGNOSIS — I73.9 PVD (PERIPHERAL VASCULAR DISEASE) (HCC): ICD-10-CM

## 2018-08-14 DIAGNOSIS — I10 ESSENTIAL HYPERTENSION: ICD-10-CM

## 2018-08-14 DIAGNOSIS — I25.10 CORONARY ARTERY DISEASE INVOLVING NATIVE CORONARY ARTERY OF NATIVE HEART WITHOUT ANGINA PECTORIS: ICD-10-CM

## 2018-08-14 DIAGNOSIS — I73.9 CLAUDICATION (HCC): Primary | ICD-10-CM

## 2018-08-14 DIAGNOSIS — R20.9 COLD HANDS AND FEET: ICD-10-CM

## 2018-08-14 DIAGNOSIS — E78.00 HYPERCHOLESTEREMIA: ICD-10-CM

## 2018-08-14 DIAGNOSIS — I71.40 ABDOMINAL AORTIC ANEURYSM (AAA) WITHOUT RUPTURE (HCC): ICD-10-CM

## 2018-08-14 DIAGNOSIS — Z98.890 HISTORY OF CAROTID ENDARTERECTOMY: ICD-10-CM

## 2018-08-14 DIAGNOSIS — S81.801A NON-HEALING WOUND OF LOWER EXTREMITY, RIGHT, INITIAL ENCOUNTER: ICD-10-CM

## 2018-08-14 DIAGNOSIS — E03.9 ACQUIRED HYPOTHYROIDISM: ICD-10-CM

## 2018-08-14 PROCEDURE — 99214 OFFICE O/P EST MOD 30 MIN: CPT | Performed by: NURSE PRACTITIONER

## 2018-08-14 RX ORDER — MUPIROCIN CALCIUM 20 MG/G
CREAM TOPICAL 3 TIMES DAILY
Qty: 1 EACH | Refills: 1 | Status: SHIPPED | OUTPATIENT
Start: 2018-08-14 | End: 2019-01-14 | Stop reason: ALTCHOICE

## 2018-08-14 RX ORDER — CLINDAMYCIN HYDROCHLORIDE 300 MG/1
300 CAPSULE ORAL 2 TIMES DAILY
Qty: 14 CAPSULE | Refills: 0 | Status: SHIPPED | OUTPATIENT
Start: 2018-08-14 | End: 2019-01-14 | Stop reason: ALTCHOICE

## 2018-08-14 NOTE — PROGRESS NOTES
Chief Complaint   Patient presents with   • Follow-up     Cardiac management. She reports no recent labs.   • Edema     See encounter 8/13/18. She reports at night right leg swells more, skin tears noted to right lower leg due to dog scratched. She reports starting to have some discoloration to left and some occasional swelling.   • Aspirin     Patient does not take.   • Shortness of Breath     She reports with exertion.        Cardiac Complaints  claudication      Subjective   Dinah Gaines is a 78 y.o. female with a history of ischemic heart disease diagnosed in 2011 when she underwent stenting of the right coronary artery.  Stress test done in 2015 showed no evidence of ischemia.  Echocardiogram done in March 2017 showed normal LV function and only mild MR.  In 2017, she was having a problem with cold hands and feet.  She underwent KILEY which was abnormal, and CTA of abdomen with runoff showed 80% stenosis of right femoral which was stented by Dr. Heart.  CTA of her neck showed 80% lesion of LINWOOD for which she underwent (R)CEA with patch.  She developed GI bleeding in November 2017 and was hospitalized found to have duodenal ulcer treated with IV PPI.  Dr. Franks recommended stopping Plavix since her GI bleeding risk was high and stenting was more than 6 months prior.  She was incidentally noted to have a 3.6 cm infrarenal AAA.  Soon after discharge she had a syncopal episode and was noted to be hypotensive.  Clonidine and lisinopril were decreased, then clonidine was stopped.  She monitored blood pressure closely which was well controlled.  She called the office on 12/12/17 with weakness and diaphoresis. She was concerned her symptoms were cardiac and was advised to come in for evaluation. She wanted to wait until after the holidays. She was then seen 1/2/18. She reported feeling well and no more symptoms. She then returned in July for and was encouraged on daily walks, daily ASA was advised to be  considered if bleeding risk is gone.  She was also advised to increase her lisinopril as blood pressure was elevated.  US of AAA showed slight increase in size to 3.7cm from 3.6cm.  Continued monitoring recommended. She returns today for follow up and reports issues with her right leg with swelling, claudication pain, and a non healing wound that she has had for about a week from a dog scratch.  Patient admits to dressing the wound with neosporin and states it has improved some.  Her left calf also has an area about the size of a half dollar she states has improved over the last month and is now scabbing over after her grandson's heal hit her leg.  She reports continued venous discoloration but states her legs have been very cold and painful. She has not been on ASA therapy for fear of GI bleed since she had one in the past.  Labs and refills she reports with PCP.            Cardiac History  Past Surgical History:   Procedure Laterality Date   • CARDIAC CATHETERIZATION  04/04/2011    - 3.5x28 Promus in RCA. EF 45%   • CARDIOVASCULAR STRESS TEST  05/07/2015    Stress 4 min 10 sec, 68% THR, 180/100, negative for ischemia   • CAROTID ENDARTERECTOMY  03/31/2017    Right CEA with patch.   • CONVERTED (HISTORICAL) HOLTER  07/18/2013    Holter- baseline sinus, lowest HR 43 bpm   • CONVERTED (HISTORICAL) HOLTER  06/11/2014    Holter- borderline sinus, lowest HR 52bpm, frequent PVCs noted   • ECHO - CONVERTED  05/07/2015    Echo- EF 50-55%, RVSP 42 mmHg, mild MR   • ECHO - CONVERTED  03/28/2017    EF 60%, mild MR, RSVP 35   • OTHER SURGICAL HISTORY  03/01/2017    CTA with runoff:  3.6 cm AAA, right iliac 50-60%, R femoral 80% stenosis   • OTHER SURGICAL HISTORY  03/14/2017    CTA neck:  80% LINWOOD   • OTHER SURGICAL HISTORY  03/17/2017    Stent to right iliac and angioplasty   • OTHER SURGICAL HISTORY  07/10/2018    US of AAA:  3.7cm infrarenal AAA   • US CAROTID UNILATERAL  03/01/2017    Conor:  60% LINWOOD       Current  Outpatient Prescriptions   Medication Sig Dispense Refill   • citalopram (CeleXA) 20 MG tablet Takes 1/2 tab     • clonazePAM (KlonoPIN) 1 MG tablet Take 1 mg by mouth Daily.     • levothyroxine (SYNTHROID, LEVOTHROID) 75 MCG tablet Take 75 mcg by mouth daily.     • lisinopril (PRINIVIL,ZESTRIL) 20 MG tablet Take 1/2 tablet in morning and 1/2 tablet at night 30 tablet 11   • Multiple Vitamin (MULTI VITAMIN DAILY PO) Take  by mouth daily.     • nitroglycerin (NITROSTAT) 0.4 MG SL tablet 1 under the tongue as needed for angina, may repeat q5mins for up three doses 25 tablet 1   • omeprazole (priLOSEC) 40 MG capsule Take 40 mg by mouth Daily.     • Polyethylene Glycol 3350 granules Take  by mouth Daily As Needed.     • simvastatin (ZOCOR) 40 MG tablet Take 40 mg by mouth every night.     • aspirin 81 MG tablet Take 1 tablet by mouth Daily. 30 tablet 11   • clindamycin (CLEOCIN) 300 MG capsule Take 1 capsule by mouth 2 (Two) Times a Day. 14 capsule 0   • mupirocin (BACTROBAN) 2 % cream Apply  topically to the appropriate area as directed 3 (Three) Times a Day. 1 each 1     No current facility-administered medications for this visit.        Patient has no known allergies.    Past Medical History:   Diagnosis Date   • Abrasion of arm, left 09/2014   • Anemia    • Anxiety disorder    • Atherosclerotic heart disease    • Bleeding ulcer    • CAD (coronary artery disease)     followed by Dr. Phelps for CAD 60% (R) internal and 3.8 ectasia of the ascending thoracic aorta.   • H/O tubal ligation    • History of MI (myocardial infarction) 2011    s/p Cardiac Cath s/p Stent placement x1 Dr. Alan   • Hyperlipidemia    • Hypertension    • Hypothyroidism    • Osteoarthritis    • Osteoporosis    • RLS (restless legs syndrome)        Social History     Social History   • Marital status:      Spouse name: N/A   • Number of children: N/A   • Years of education: N/A     Occupational History   • Not on file.     Social History  "Main Topics   • Smoking status: Former Smoker     Quit date: 2011   • Smokeless tobacco: Never Used   • Alcohol use No   • Drug use: No   • Sexual activity: Not on file     Other Topics Concern   • Not on file     Social History Narrative   • No narrative on file       Family History   Problem Relation Age of Onset   • No Known Problems Mother    • Asthma Father    • Heart attack Brother        Review of Systems   Constitution: Negative for weakness and malaise/fatigue.   Cardiovascular: Positive for claudication and leg swelling. Negative for chest pain, dyspnea on exertion, irregular heartbeat, near-syncope, palpitations and syncope.   Respiratory: Negative for cough, shortness of breath and wheezing.    Skin: Positive for color change and poor wound healing.   Gastrointestinal: Negative for anorexia, heartburn, nausea and vomiting.   Genitourinary: Negative for dysuria, hematuria, hesitancy and nocturia.   Neurological: Positive for numbness and paresthesias. Negative for brief paralysis and disturbances in coordination.   Psychiatric/Behavioral: Negative for depression and memory loss. The patient is not nervous/anxious.            Objective     /82 (BP Location: Left arm)   Pulse 60   Ht 167.6 cm (65.98\")   Wt 63 kg (139 lb)   BMI 22.45 kg/m²     Physical Exam   Constitutional: She is oriented to person, place, and time. She appears well-developed and well-nourished.   HENT:   Head: Normocephalic and atraumatic.   Eyes: Pupils are equal, round, and reactive to light. EOM are normal.   Neck: Normal range of motion. Neck supple.   Cardiovascular: Normal rate and regular rhythm.  Exam reveals decreased pulses.    Murmur heard.  Pulmonary/Chest: Effort normal and breath sounds normal.   Abdominal: Soft.   Musculoskeletal: Normal range of motion. She exhibits edema.   Neurological: She is alert and oriented to person, place, and time.   Skin: Skin is warm and dry.   Psychiatric: She has a normal mood and " affect. Her behavior is normal.       Procedures    Assessment/Plan     HR and BP are both stable.  HTN well managed on current regimen, no adjustment advised.  In regards to PVD, weak pulse in right foot, and non healing leg wounds to both right and left lower extremities and claudication; repeat arterial duplex will be advised to assess vascular status.  If it appears there is some limiting flow, repeat CTA of abdomen with runoff will be recommended with possible referral to [facility] surgeon.  In regards to non healing leg wound of right calf measuring about 1 inch wide by 8 inches long with purulent drainage noted.  Bactroban ointment ordered along with clindomycin oral to BID for 7 days.  Patient strongly urged to follow with your office in wound is not healing for C&S and possible wound care referral.  ASA will be added to current regimen at 81mg EC daily for PVD management.  If bleeding should be noted, she was advised to call the office for further recommendations.  Lipids she states have been monitored with your office as well as zocor therapy.  Patient states she has tolerated well and lipids have been well controlled.  BMI stable at 22.45, continued cardiac diet with activity as tolerated advised.  6 month follow up advised or sooner if needed.        Problems Addressed this Visit        Cardiovascular and Mediastinum    Coronary artery disease involving native coronary artery of native heart without angina pectoris    Essential hypertension    Hypercholesteremia    PVD (peripheral vascular disease) (CMS/HCC)       Endocrine    Acquired hypothyroidism       Other    Cold hands and feet    History of carotid endarterectomy      Other Visit Diagnoses     Claudication (CMS/HCC)    -  Primary    Relevant Orders    US Arterial Doppler Lower Extremity Bilateral    Non-healing wound of lower extremity, right, initial encounter        Relevant Orders    US Arterial Doppler Lower Extremity Bilateral    Abdominal aortic  aneurysm (AAA) without rupture (CMS/HCC)              Patient's Body mass index is 22.45 kg/m². BMI is within normal parameters. No follow-up required.          Electronically signed by JULITO Hill August 14, 2018 3:00 PM

## 2018-08-20 ENCOUNTER — TELEPHONE (OUTPATIENT)
Dept: CARDIOLOGY | Facility: CLINIC | Age: 78
End: 2018-08-20

## 2018-08-20 DIAGNOSIS — I73.9 PVD (PERIPHERAL VASCULAR DISEASE) (HCC): Primary | ICD-10-CM

## 2018-08-29 ENCOUNTER — TELEPHONE (OUTPATIENT)
Dept: CARDIOLOGY | Facility: CLINIC | Age: 78
End: 2018-08-29

## 2018-08-29 NOTE — TELEPHONE ENCOUNTER
Patient asking if she can travel an hour away to [place] to visit her daughter for 3 days. We do not yet have results of CTA.     [phone]

## 2018-09-06 ENCOUNTER — TELEPHONE (OUTPATIENT)
Dept: CARDIOLOGY | Facility: CLINIC | Age: 78
End: 2018-09-06

## 2018-09-06 DIAGNOSIS — I73.9 PVD (PERIPHERAL VASCULAR DISEASE) (HCC): Primary | ICD-10-CM

## 2018-09-06 RX ORDER — CILOSTAZOL 50 MG/1
50 TABLET ORAL 2 TIMES DAILY
Qty: 60 TABLET | Refills: 8 | Status: SHIPPED | OUTPATIENT
Start: 2018-09-06 | End: 2019-01-14 | Stop reason: ALTCHOICE

## 2018-09-06 NOTE — TELEPHONE ENCOUNTER
Patient aware of results of CTA with multiple areas of stenosis. Patient was told that pletal was sent to pharmacy and that she could take that or take aspirin only if she preferred. She said that she would try pletal. She was told of referral to Dr. Yap and told to get a disc of CTA from [facility]. Patient verbalized understanding.

## 2018-09-06 NOTE — TELEPHONE ENCOUNTER
Patient has not been on pletal due to bleeding in the past, she may only want ASA alone but we can try pletal BID low dose.  Please let her know that CTA shows multiple areas of stenosis and after review, she will be referred to Dr. Yap for PVD.  Please let her know to get CT of actual CTA from hospital and bring with her to appointment with Marely.

## 2018-10-19 DIAGNOSIS — I73.9 PVD (PERIPHERAL VASCULAR DISEASE) (HCC): ICD-10-CM

## 2018-10-23 ENCOUNTER — OFFICE VISIT (OUTPATIENT)
Dept: CARDIAC SURGERY | Facility: CLINIC | Age: 78
End: 2018-10-23

## 2018-10-23 VITALS
HEIGHT: 66 IN | HEART RATE: 69 BPM | OXYGEN SATURATION: 69 % | DIASTOLIC BLOOD PRESSURE: 84 MMHG | SYSTOLIC BLOOD PRESSURE: 134 MMHG | WEIGHT: 134 LBS | BODY MASS INDEX: 21.53 KG/M2

## 2018-10-23 DIAGNOSIS — I71.40 ABDOMINAL AORTIC ANEURYSM (AAA) WITHOUT RUPTURE (HCC): ICD-10-CM

## 2018-10-23 DIAGNOSIS — I73.9 PVD (PERIPHERAL VASCULAR DISEASE) (HCC): Primary | ICD-10-CM

## 2018-10-23 PROCEDURE — 99203 OFFICE O/P NEW LOW 30 MIN: CPT | Performed by: THORACIC SURGERY (CARDIOTHORACIC VASCULAR SURGERY)

## 2019-01-14 ENCOUNTER — OFFICE VISIT (OUTPATIENT)
Dept: CARDIOLOGY | Facility: CLINIC | Age: 79
End: 2019-01-14

## 2019-01-14 VITALS
DIASTOLIC BLOOD PRESSURE: 88 MMHG | HEIGHT: 66 IN | HEART RATE: 60 BPM | WEIGHT: 132 LBS | SYSTOLIC BLOOD PRESSURE: 138 MMHG | BODY MASS INDEX: 21.21 KG/M2

## 2019-01-14 DIAGNOSIS — E78.00 HYPERCHOLESTEREMIA: ICD-10-CM

## 2019-01-14 DIAGNOSIS — I25.10 CORONARY ARTERY DISEASE INVOLVING NATIVE CORONARY ARTERY OF NATIVE HEART WITHOUT ANGINA PECTORIS: Primary | ICD-10-CM

## 2019-01-14 DIAGNOSIS — Z98.890 HISTORY OF CAROTID ENDARTERECTOMY: ICD-10-CM

## 2019-01-14 DIAGNOSIS — I73.9 PVD (PERIPHERAL VASCULAR DISEASE) (HCC): ICD-10-CM

## 2019-01-14 DIAGNOSIS — I71.40 ABDOMINAL AORTIC ANEURYSM (AAA) WITHOUT RUPTURE (HCC): ICD-10-CM

## 2019-01-14 DIAGNOSIS — I10 ESSENTIAL HYPERTENSION: ICD-10-CM

## 2019-01-14 PROCEDURE — 99214 OFFICE O/P EST MOD 30 MIN: CPT | Performed by: NURSE PRACTITIONER

## 2019-01-14 RX ORDER — LISINOPRIL 40 MG/1
40 TABLET ORAL 2 TIMES DAILY
COMMUNITY

## 2019-01-14 NOTE — PROGRESS NOTES
Chief Complaint   Patient presents with   • Follow-up     For cardiac management. Patient is not on aspirin. Last lab work was done a few months ago per PCP, not in chart.    • Med Refill     PCP does medication refills. Brought medication list with visit.        Cardiac Complaints  none      Subjective   Dinah Gaines is a 78 y.o. female with a history of ischemic heart disease diagnosed in 2011 when she underwent stenting of the right coronary artery.  Stress test done in 2015 showed no evidence of ischemia.  Echocardiogram done in March 2017 showed normal LV function and only mild MR.  In 2017, she was having a problem with cold hands and feet.  She underwent KILEY which was abnormal, and CTA of abdomen with runoff showed 80% stenosis of right femoral which was stented by Dr. Heart.  CTA of her neck showed 80% lesion of LINWOOD for which she underwent (R)CEA with patch.  She developed GI bleeding in November 2017 and was hospitalized found to have duodenal ulcer treated with IV PPI.  Dr. Franks recommended stopping Plavix since her GI bleeding risk was high and stenting was more than 6 months prior.  She was incidentally noted to have a 3.6 cm infrarenal AAA.  Soon after discharge she had a syncopal episode and was noted to be hypotensive.  Clonidine and lisinopril were decreased, then clonidine was stopped.  She monitored blood pressure closely which was well controlled.  She called the office on 12/12/17 with weakness and diaphoresis. She was concerned her symptoms were cardiac and was advised to come in for evaluation. She wanted to wait until after the holidays. She was then seen 1/2/18. She reported feeling well and no more symptoms. She then returned in July for and was encouraged on daily walks, daily ASA was advised to be considered if bleeding risk is gone.  She was also advised to increase her lisinopril as blood pressure was elevated.  US of AAA showed slight increase in size to 3.7cm from 3.6cm.   Continued monitoring recommended. At last visit, she returned for follow up and reported issues with her right leg with swelling, claudication pain, and a non healing wound that she had for over a week due to a dog scratch.  ASA was advised to be added to current for PVD.  She comes today for follow up and denies any new concerns.  She denies any claudication pain at present and denies any further ulcerations of her BLE.  She does report she had issues with her blood pressure and had to have lisinopril increased back to BID.  No refills currently needed.  Labs are done with PCP, she is unsure of most recent.  She reports she is to have more labs in April.            Cardiac History  Past Surgical History:   Procedure Laterality Date   • CARDIAC CATHETERIZATION  04/04/2011    - 3.5x28 Promus in RCA. EF 45%   • CARDIOVASCULAR STRESS TEST  05/07/2015    Stress 4 min 10 sec, 68% THR, 180/100, negative for ischemia   • CAROTID ENDARTERECTOMY  03/31/2017    Right CEA with patch.   • CONVERTED (HISTORICAL) HOLTER  07/18/2013    Holter- baseline sinus, lowest HR 43 bpm   • CONVERTED (HISTORICAL) HOLTER  06/11/2014    Holter- borderline sinus, lowest HR 52bpm, frequent PVCs noted   • CORONARY STENT PLACEMENT     • ECHO - CONVERTED  05/07/2015    Echo- EF 50-55%, RVSP 42 mmHg, mild MR   • ECHO - CONVERTED  03/28/2017    EF 60%, mild MR, RSVP 35   • ESOPHAGEAL DILATATION     • LEG SURGERY      STENTS   • OTHER SURGICAL HISTORY  03/01/2017    CTA with runoff:  3.6 cm AAA, right iliac 50-60%, R femoral 80% stenosis   • OTHER SURGICAL HISTORY  03/14/2017    CTA neck:  80% LINWOOD   • OTHER SURGICAL HISTORY  03/17/2017    Stent to right iliac and angioplasty   • OTHER SURGICAL HISTORY  07/10/2018    US of AAA:  3.7cm infrarenal AAA   • OTHER SURGICAL HISTORY  07/10/2018    CTA abdomen with runoff:  Multiple areas of stenosis, 80% popliteal right, 60% posterior tibialis   • TUBAL ABDOMINAL LIGATION     • US CAROTID UNILATERAL   03/01/2017    Conor:  60% LINWOOD       Current Outpatient Medications   Medication Sig Dispense Refill   • citalopram (CeleXA) 20 MG tablet Take 20 mg by mouth Daily.     • clonazePAM (KlonoPIN) 1 MG tablet Take 1 mg by mouth Daily.     • levothyroxine (SYNTHROID, LEVOTHROID) 75 MCG tablet Take 75 mcg by mouth daily.     • lisinopril (PRINIVIL,ZESTRIL) 40 MG tablet Take 40 mg by mouth 2 (Two) Times a Day.     • Multiple Vitamin (MULTI VITAMIN DAILY PO) Take  by mouth daily.     • nitroglycerin (NITROSTAT) 0.4 MG SL tablet 1 under the tongue as needed for angina, may repeat q5mins for up three doses 25 tablet 1   • omeprazole (priLOSEC) 40 MG capsule Take 40 mg by mouth Daily.     • Polyethylene Glycol 3350 granules Take  by mouth Daily As Needed.     • simvastatin (ZOCOR) 40 MG tablet Take 40 mg by mouth every night.     • aspirin 81 MG tablet Take 1 tablet by mouth Daily. 30 tablet 0     No current facility-administered medications for this visit.        Patient has no known allergies.    Past Medical History:   Diagnosis Date   • Abdominal aortic aneurysm (AAA) (CMS/[facility])    • Abrasion of arm, left 09/2014   • Anemia    • Anxiety disorder    • Atherosclerotic heart disease    • Bleeding ulcer    • CAD (coronary artery disease)     followed by Dr. Phelps for CAD 60% (R) internal and 3.8 ectasia of the ascending thoracic aorta.   • Carotid stenosis    • Esophageal stricture    • History of MI (myocardial infarction) 2011    s/p Cardiac Cath s/p Stent placement x1 Dr. Alan   • Hyperlipidemia    • Hypertension    • Hypothyroidism    • Osteoarthritis    • Osteoporosis    • Peripheral vascular disease (CMS/[facility])    • RLS (restless legs syndrome)        Social History     Socioeconomic History   • Marital status:      Spouse name: Not on file   • Number of children: 2   • Years of education: Not on file   • Highest education level: Not on file   Social Needs   • Financial resource strain: Not on file   • Food  "insecurity - worry: Not on file   • Food insecurity - inability: Not on file   • Transportation needs - medical: Not on file   • Transportation needs - non-medical: Not on file   Occupational History   • Occupation: RETIRED/[employer]   Tobacco Use   • Smoking status: Former Smoker     Packs/day: 0.50     Types: Cigarettes     Last attempt to quit:      Years since quittin.0   • Smokeless tobacco: Never Used   • Tobacco comment: SMOKED OVER 20 YEARS   Substance and Sexual Activity   • Alcohol use: No   • Drug use: No   • Sexual activity: Not on file   Other Topics Concern   • Not on file   Social History Narrative    LIVES WITH HER  IN [facility]       Family History   Problem Relation Age of Onset   • Pneumonia Mother    • Arthritis Mother    • Asthma Father    • Heart attack Brother        Review of Systems   Constitution: Negative for weakness and malaise/fatigue.   Cardiovascular: Negative for chest pain, dyspnea on exertion, irregular heartbeat, leg swelling, near-syncope, palpitations and syncope.   Respiratory: Negative for cough, shortness of breath and wheezing.    Musculoskeletal: Negative for back pain, joint pain and joint swelling.   Gastrointestinal: Negative for anorexia, heartburn, nausea and vomiting.   Genitourinary: Negative for dysuria, hematuria, hesitancy and nocturia.   Neurological: Negative for dizziness, light-headedness and loss of balance.   Psychiatric/Behavioral: Negative for depression and memory loss. The patient is not nervous/anxious.            Objective     /88 (BP Location: Right arm)   Pulse 60   Ht 167.6 cm (65.98\")   Wt 59.9 kg (132 lb)   BMI 21.32 kg/m²     Physical Exam   Constitutional: She is oriented to person, place, and time. She appears well-developed and well-nourished.   HENT:   Head: Normocephalic and atraumatic.   Eyes: EOM are normal. Pupils are equal, round, and reactive to light.   Neck: Normal range of motion. Neck supple. "   Cardiovascular: Normal rate and regular rhythm.   Murmur heard.  Pulmonary/Chest: Effort normal and breath sounds normal.   Abdominal: Soft.   Musculoskeletal: Normal range of motion.   Neurological: She is alert and oriented to person, place, and time.   Skin: Skin is warm and dry.   Psychiatric: She has a normal mood and affect. Her behavior is normal.       Procedures    Assessment/Plan     HR and BP are stable today.  HTN is currently well managed on current, no adjustment to regimen advised.  For both CAD and PVD, patient encouraged to add back baby EC ASA to medication regimen.  She denies any more claudication pain today, any ulcers, or any edema to either BLE.  For now, conservative management and follow ups with Dr. Yap advised.  He is to be checking an AAA US this year.  For hyperlipidemia, she has continued on statin therapy and reports your office as following labs including the FLP.  Could we have next labs for our review?  No refills are currently needed as patient reports with your office.  No new cardiac workup will be advised at this time as no new cardiac concerns are voiced.  BMI stable at 21.32, continued cardiac diet and activity as tolerated advised.  6 month follow up scheduled or sooner if needed.        Problems Addressed this Visit        Cardiovascular and Mediastinum    Coronary artery disease involving native coronary artery of native heart without angina pectoris - Primary    Essential hypertension    Relevant Medications    lisinopril (PRINIVIL,ZESTRIL) 40 MG tablet    Hypercholesteremia    PVD (peripheral vascular disease) (CMS/HCC)    Abdominal aortic aneurysm (AAA) without rupture (CMS/HCC)       Other    History of carotid endarterectomy          Patient's Body mass index is 21.32 kg/m². BMI is within normal parameters. No follow-up required.          Electronically signed by JULITO Hill January 14, 2019 4:00 PM

## 2019-07-15 ENCOUNTER — OFFICE VISIT (OUTPATIENT)
Dept: CARDIOLOGY | Facility: CLINIC | Age: 79
End: 2019-07-15

## 2019-07-15 VITALS
HEART RATE: 56 BPM | BODY MASS INDEX: 22.18 KG/M2 | HEIGHT: 66 IN | DIASTOLIC BLOOD PRESSURE: 80 MMHG | SYSTOLIC BLOOD PRESSURE: 150 MMHG | WEIGHT: 138 LBS

## 2019-07-15 DIAGNOSIS — R09.89 BRUIT: ICD-10-CM

## 2019-07-15 DIAGNOSIS — I71.40 ABDOMINAL AORTIC ANEURYSM (AAA) WITHOUT RUPTURE (HCC): Primary | ICD-10-CM

## 2019-07-15 DIAGNOSIS — I10 ESSENTIAL HYPERTENSION: ICD-10-CM

## 2019-07-15 DIAGNOSIS — I73.9 PVD (PERIPHERAL VASCULAR DISEASE) (HCC): ICD-10-CM

## 2019-07-15 DIAGNOSIS — Z98.890 HISTORY OF CAROTID ENDARTERECTOMY: ICD-10-CM

## 2019-07-15 DIAGNOSIS — I25.10 CORONARY ARTERY DISEASE INVOLVING NATIVE CORONARY ARTERY OF NATIVE HEART WITHOUT ANGINA PECTORIS: ICD-10-CM

## 2019-07-15 DIAGNOSIS — E78.00 HYPERCHOLESTEREMIA: ICD-10-CM

## 2019-07-15 PROCEDURE — 99214 OFFICE O/P EST MOD 30 MIN: CPT | Performed by: NURSE PRACTITIONER

## 2019-07-15 RX ORDER — AMLODIPINE BESYLATE 5 MG/1
5 TABLET ORAL DAILY
Qty: 30 TABLET | Refills: 11 | Status: SHIPPED | OUTPATIENT
Start: 2019-07-15 | End: 2020-07-15

## 2019-07-15 NOTE — PROGRESS NOTES
Chief Complaint   Patient presents with   • Follow-up     Cardiac management. She had eye surgery last week. Last labs 4 months ago per PCP. PCP writes refills on medication.   • Chest Pain     Has some pressure to mid chest, she feels related to indigestion.       Subjective       Dinah Gaines is an 79 y.o. female with IHD diagnosed in 2011 when she underwent stenting of the RCA. Stress in 2015 showed no ischemia.  In 2017, she c/o cold hands and feet. KILEY was abnormal. CTA of abdomen with runoff showed 80% stenosis of right femoral which was stented by Dr. Heart.  CTA of her neck showed 80% lesion of LINWOOD for which she underwent (R)CEA with patch.  She developed GI bleeding in November 2017 and was hospitalized found to have duodenal ulcer treated with IV PPI.  Dr. Franks recommended stopping Plavix since her GI bleeding risk was high and stenting was more than 6 months prior.  She was incidentally noted to have a 3.6 cm infrarenal AAA.  Soon after discharge she had a syncopal episode noted to be hypotensive.  Clonidine and lisinopril decreased, then clonidine stopped. She came today for follow up. Denies angina, SOB, palpitations, dizziness. No claudication pain. BP has been more elevated recently. She has some indigestion relieved with antiacids. Labs followed with Dr. Allen.   HPI         Cardiac History:    Past Surgical History:   Procedure Laterality Date   • CARDIAC CATHETERIZATION  04/04/2011    - 3.5x28 Promus in RCA. EF 45%   • CARDIOVASCULAR STRESS TEST  05/07/2015    Stress 4 min 10 sec, 68% THR, 180/100, negative for ischemia   • CAROTID ENDARTERECTOMY  03/31/2017    Right CEA with patch.   • CONVERTED (HISTORICAL) HOLTER  07/18/2013    Holter- baseline sinus, lowest HR 43 bpm   • CONVERTED (HISTORICAL) HOLTER  06/11/2014    Holter- borderline sinus, lowest HR 52bpm, frequent PVCs noted   • ECHO - CONVERTED  05/07/2015    Echo- EF 50-55%, RVSP 42 mmHg, mild MR   • ECHO - CONVERTED   03/28/2017    EF 60%, mild MR, RSVP 35   • OTHER SURGICAL HISTORY  03/01/2017    CTA with runoff:  3.6 cm AAA, right iliac 50-60%, R femoral 80% stenosis   • OTHER SURGICAL HISTORY  03/14/2017    CTA neck:  80% LINWOOD   • OTHER SURGICAL HISTORY  03/17/2017    Stent to right iliac and angioplasty   • OTHER SURGICAL HISTORY  07/10/2018    US of AAA:  3.7cm infrarenal AAA   • OTHER SURGICAL HISTORY  07/10/2018    CTA abdomen with runoff:  Multiple areas of stenosis, 80% popliteal right, 60% posterior tibialis   • OTHER SURGICAL HISTORY  07/16/2019    Carotid US- <50% bilaterally   • OTHER SURGICAL HISTORY  07/16/2019    Abd US- 3.7 cm aneurysm   • US CAROTID UNILATERAL  03/01/2017    Conor:  60% LINWOOD       Current Outpatient Medications   Medication Sig Dispense Refill   • aspirin 81 MG tablet Take 1 tablet by mouth Daily. 30 tablet 0   • citalopram (CeleXA) 20 MG tablet Take 20 mg by mouth Daily.     • clonazePAM (KlonoPIN) 1 MG tablet Take 1 mg by mouth Daily.     • levothyroxine (SYNTHROID, LEVOTHROID) 75 MCG tablet Take 75 mcg by mouth daily.     • lisinopril (PRINIVIL,ZESTRIL) 40 MG tablet Take 40 mg by mouth 2 (Two) Times a Day.     • Multiple Vitamin (MULTI VITAMIN DAILY PO) Take  by mouth daily.     • nitroglycerin (NITROSTAT) 0.4 MG SL tablet 1 under the tongue as needed for angina, may repeat q5mins for up three doses 25 tablet 1   • omeprazole (priLOSEC) 40 MG capsule Take 40 mg by mouth Daily.     • Polyethylene Glycol 3350 granules Take  by mouth Daily As Needed.     • simvastatin (ZOCOR) 40 MG tablet Take 40 mg by mouth every night.     • amLODIPine (NORVASC) 5 MG tablet Take 1 tablet by mouth Daily. Start with 1/2 tablet daily. Monitor bp. 30 tablet 11     No current facility-administered medications for this visit.      Patient has no known allergies.    Past Medical History:   Diagnosis Date   • Abdominal aortic aneurysm (AAA) (CMS/HCC)    • Abrasion of arm, left 09/2014   • Anemia    • Anxiety disorder     • Atherosclerotic heart disease    • Bleeding ulcer    • CAD (coronary artery disease)     followed by Dr. Phelps for CAD 60% (R) internal and 3.8 ectasia of the ascending thoracic aorta.   • Carotid stenosis    • Esophageal stricture    • H/O eye surgery 2019   • History of MI (myocardial infarction)     s/p Cardiac Cath s/p Stent placement x1 Dr. Alan   • Hyperlipidemia    • Hypertension    • Hypothyroidism    • Osteoarthritis    • Osteoporosis    • Peripheral vascular disease (CMS/HCC)    • RLS (restless legs syndrome)      Social History     Socioeconomic History   • Marital status:      Spouse name: Not on file   • Number of children: 2   • Years of education: Not on file   • Highest education level: Not on file   Occupational History   • Occupation: RETIRED/[employer]   Tobacco Use   • Smoking status: Former Smoker     Packs/day: 0.50     Types: Cigarettes     Last attempt to quit:      Years since quittin.5   • Smokeless tobacco: Never Used   • Tobacco comment: SMOKED OVER 20 YEARS   Substance and Sexual Activity   • Alcohol use: No   • Drug use: No   Social History Narrative    LIVES WITH HER  IN [facility]     Family History   Problem Relation Age of Onset   • Pneumonia Mother    • Arthritis Mother    • Asthma Father    • Heart attack Brother      Review of Systems   Constitution: Positive for weight gain (up 6 lb ). Negative for weakness and malaise/fatigue.   HENT: Negative.    Eyes:        Recent eye surgery   Cardiovascular: Negative for chest pain, dyspnea on exertion, leg swelling, palpitations and syncope.   Respiratory: Negative for shortness of breath.    Endocrine: Negative.    Hematologic/Lymphatic: Negative.    Skin: Negative.    Musculoskeletal: Negative for falls and myalgias.   Gastrointestinal: Positive for heartburn (mild ). Negative for abdominal pain and melena.   Genitourinary: Negative for dysuria and hematuria.   Neurological: Negative for  "dizziness.   Psychiatric/Behavioral: Negative for altered mental status and depression.   Allergic/Immunologic: Negative.       Diabetes- No  Thyroid-normal    Objective     /80 (BP Location: Left arm)   Pulse 56   Ht 167.6 cm (65.98\")   Wt 62.6 kg (138 lb)   BMI 22.28 kg/m²     Physical Exam   Constitutional: She appears well-developed and well-nourished.   HENT:   Head: Normocephalic.   Eyes: Pupils are equal, round, and reactive to light.   Neck: Normal range of motion.   Cardiovascular: Normal rate, regular rhythm and intact distal pulses.  No extrasystoles are present.   Pulses:       Dorsalis pedis pulses are 2+ on the right side, and 2+ on the left side.   Pulmonary/Chest: Effort normal and breath sounds normal.   Abdominal: Soft. Bowel sounds are normal.   Musculoskeletal: Normal range of motion. She exhibits no edema.   Neurological: She is alert.   Skin: Skin is warm and dry.   Psychiatric: She has a normal mood and affect.   Nursing note and vitals reviewed.     Procedures          Assessment/Plan    Heart rate stable. Blood pressure mildly elevated. BP also elevated at home per patient. With CAD, carotid stenosis, AAA, will try to keep BP <130. She is on maximum dose of lisinopril. Since renal function not available at present, she is advised to add amlodipine 5 mg daily. Start with 1/2 tablet and monitor bp. If not reaching goal, will increase to whole tablet. Advised to monitor for myalgia as she is on simvastatin and report. If she develops worsening GERD, will try to obtain labs and add HCTZ. Carotid US ordered to evaluate carotid stenosis. US abdomen ordered to measure AAA. Her last stress test in 2015 remained negative for ischemia. Since she has no angina, will continue to monitor only. Next year, will advise to repeat stress unless she develops symptoms prior. She appears stable. She will monitor response to amlodipine and let us know. Will see her back in six months.   Dinah was seen " today for follow-up and chest pain.    Diagnoses and all orders for this visit:    Abdominal aortic aneurysm (AAA) without rupture (CMS/HCC)  -     US Aorta Limited; Future    Coronary artery disease involving native coronary artery of native heart without angina pectoris    Essential hypertension  -     US Aorta Limited; Future  -     US Carotid Bilateral; Future    Hypercholesteremia  -     US Aorta Limited; Future  -     US Carotid Bilateral; Future    PVD (peripheral vascular disease) (CMS/HCC)    History of carotid endarterectomy  -     US Carotid Bilateral; Future    Bruit  -     US Carotid Bilateral; Future    Other orders  -     amLODIPine (NORVASC) 5 MG tablet; Take 1 tablet by mouth Daily. Start with 1/2 tablet daily. Monitor bp.        Patient's Body mass index is 22.28 kg/m². BMI is within normal parameters. No follow-up required..                 Electronically signed by JULITO Kaplan,  July 19, 2019 9:06 AM

## 2019-07-16 ENCOUNTER — HOSPITAL ENCOUNTER (OUTPATIENT)
Dept: CARDIOLOGY | Facility: HOSPITAL | Age: 79
Discharge: HOME | End: 2019-07-16

## 2019-07-16 ENCOUNTER — HOSPITAL ENCOUNTER (OUTPATIENT)
Dept: CARDIOLOGY | Facility: HOSPITAL | Age: 79
Discharge: HOME | End: 2019-07-16
Admitting: NURSE PRACTITIONER

## 2019-07-16 DIAGNOSIS — I10 ESSENTIAL HYPERTENSION: ICD-10-CM

## 2019-07-16 DIAGNOSIS — R09.89 BRUIT: ICD-10-CM

## 2019-07-16 DIAGNOSIS — E78.00 HYPERCHOLESTEREMIA: ICD-10-CM

## 2019-07-16 DIAGNOSIS — I71.40 ABDOMINAL AORTIC ANEURYSM (AAA) WITHOUT RUPTURE (HCC): ICD-10-CM

## 2019-07-16 DIAGNOSIS — Z98.890 HISTORY OF CAROTID ENDARTERECTOMY: ICD-10-CM

## 2019-07-16 PROCEDURE — 76775 US EXAM ABDO BACK WALL LIM: CPT | Performed by: RADIOLOGY

## 2019-07-16 PROCEDURE — 76775 US EXAM ABDO BACK WALL LIM: CPT

## 2019-07-16 PROCEDURE — 93880 EXTRACRANIAL BILAT STUDY: CPT

## 2019-07-16 PROCEDURE — 93880 EXTRACRANIAL BILAT STUDY: CPT | Performed by: RADIOLOGY

## 2019-07-26 ENCOUNTER — CLINICAL SUPPORT (OUTPATIENT)
Dept: CARDIOLOGY | Facility: CLINIC | Age: 79
End: 2019-07-26

## 2019-07-26 VITALS — DIASTOLIC BLOOD PRESSURE: 80 MMHG | SYSTOLIC BLOOD PRESSURE: 146 MMHG

## 2019-07-26 DIAGNOSIS — Z01.30 BLOOD PRESSURE CHECK: Primary | ICD-10-CM

## 2019-10-25 ENCOUNTER — TELEPHONE (OUTPATIENT)
Dept: CARDIAC SURGERY | Facility: CLINIC | Age: 79
End: 2019-10-25

## 2019-10-25 DIAGNOSIS — I71.40 ABDOMINAL AORTIC ANEURYSM (AAA) WITHOUT RUPTURE (HCC): Primary | ICD-10-CM

## 2020-01-22 ENCOUNTER — OFFICE VISIT (OUTPATIENT)
Dept: CARDIOLOGY | Facility: CLINIC | Age: 80
End: 2020-01-22

## 2020-01-22 VITALS
SYSTOLIC BLOOD PRESSURE: 126 MMHG | DIASTOLIC BLOOD PRESSURE: 82 MMHG | HEIGHT: 66 IN | HEART RATE: 59 BPM | BODY MASS INDEX: 22.18 KG/M2 | OXYGEN SATURATION: 92 % | WEIGHT: 138 LBS

## 2020-01-22 DIAGNOSIS — E78.00 HYPERCHOLESTEREMIA: ICD-10-CM

## 2020-01-22 DIAGNOSIS — Z98.890 HISTORY OF CAROTID ENDARTERECTOMY: ICD-10-CM

## 2020-01-22 DIAGNOSIS — I10 ESSENTIAL HYPERTENSION: ICD-10-CM

## 2020-01-22 DIAGNOSIS — I65.23 BILATERAL CAROTID ARTERY STENOSIS: ICD-10-CM

## 2020-01-22 DIAGNOSIS — I71.40 ABDOMINAL AORTIC ANEURYSM (AAA) WITHOUT RUPTURE (HCC): ICD-10-CM

## 2020-01-22 DIAGNOSIS — I25.10 CORONARY ARTERY DISEASE INVOLVING NATIVE CORONARY ARTERY OF NATIVE HEART WITHOUT ANGINA PECTORIS: ICD-10-CM

## 2020-01-22 PROCEDURE — 99213 OFFICE O/P EST LOW 20 MIN: CPT | Performed by: NURSE PRACTITIONER

## 2020-01-22 NOTE — PROGRESS NOTES
Chief Complaint   Patient presents with   • Follow-up     cardiac management,patient giorgio chest pain, no shortness of breath, no palpitatiopns, no swelling in legs, patient is having no cardiac problems    • Med Refill     no med refills at this time, patient does take daily aspirin 81 mg    • lab work     patient PCP monitors labs        Subjective       Dinah Gaines is a 79 y.o. female with IHD diagnosed in 2011 when she underwent stenting of the RCA. Stress in 2015 showed no ischemia.  In 2017, she c/o cold hands and feet. KILEY was abnormal. CTA of abdomen with runoff showed 80% stenosis of right femoral which was stented by Dr. Heart.  CTA of her neck showed 80% lesion of LINWOOD for which she underwent (R)CEA with patch.  She developed GI bleeding in November 2017 and was hospitalized found to have duodenal ulcer treated with IV PPI.  Dr. Franks recommended stopping Plavix since her GI bleeding risk was high and stenting was more than 6 months prior.  She was incidentally noted to have a 3.6 cm infrarenal AAA.  Soon after discharge she had a syncopal episode noted to be hypotensive.  Clonidine and lisinopril decreased, then clonidine stopped.     At last office visit carotid ultrasounds and abdominal aortic ultrasounds ordered.  On 7/16/2019 carotid ultrasound showed plaque involving both carotid arteries with stenosis less than 50% bilaterally.  Abdominal aneurysm showed max diameter of 3.7 cm.    Today she comes to the office for a follow-up visit.  She denies chest pain, dizziness, palpitations, or shortness of breath.  She continues to work part-time and maintains her normal daily activities.  No recent medication changes are noted.    HPI     Cardiac History:    Past Surgical History:   Procedure Laterality Date   • CARDIAC CATHETERIZATION  04/04/2011    - 3.5x28 Promus in RCA. EF 45%   • CARDIOVASCULAR STRESS TEST  05/07/2015    Stress 4 min 10 sec, 68% THR, 180/100, negative for ischemia   •  CAROTID ENDARTERECTOMY  03/31/2017    Right CEA with patch.   • CONVERTED (HISTORICAL) HOLTER  07/18/2013    Holter- baseline sinus, lowest HR 43 bpm   • CONVERTED (HISTORICAL) HOLTER  06/11/2014    Holter- borderline sinus, lowest HR 52bpm, frequent PVCs noted   • ECHO - CONVERTED  05/07/2015    Echo- EF 50-55%, RVSP 42 mmHg, mild MR   • ECHO - CONVERTED  03/28/2017    EF 60%, mild MR, RSVP 35   • OTHER SURGICAL HISTORY  03/01/2017    CTA with runoff:  3.6 cm AAA, right iliac 50-60%, R femoral 80% stenosis   • OTHER SURGICAL HISTORY  03/14/2017    CTA neck:  80% LINWOOD   • OTHER SURGICAL HISTORY  03/17/2017    Stent to right iliac and angioplasty   • OTHER SURGICAL HISTORY  07/10/2018    US of AAA:  3.7cm infrarenal AAA   • OTHER SURGICAL HISTORY  07/10/2018    CTA abdomen with runoff:  Multiple areas of stenosis, 80% popliteal right, 60% posterior tibialis   • OTHER SURGICAL HISTORY  07/16/2019    Carotid US- <50% bilaterally   • OTHER SURGICAL HISTORY  07/16/2019    Abd US- 3.7 cm aneurysm   • US CAROTID UNILATERAL  03/01/2017    Conor:  60% LINWOOD       Current Outpatient Medications   Medication Sig Dispense Refill   • amLODIPine (NORVASC) 5 MG tablet Take 1 tablet by mouth Daily. Start with 1/2 tablet daily. Monitor bp. 30 tablet 11   • aspirin 81 MG tablet Take 1 tablet by mouth Daily. 30 tablet 0   • citalopram (CeleXA) 20 MG tablet Take 20 mg by mouth Daily.     • clonazePAM (KlonoPIN) 1 MG tablet Take 1 mg by mouth Daily.     • levothyroxine (SYNTHROID, LEVOTHROID) 75 MCG tablet Take 75 mcg by mouth daily.     • lisinopril (PRINIVIL,ZESTRIL) 40 MG tablet Take 40 mg by mouth 2 (Two) Times a Day.     • Multiple Vitamin (MULTI VITAMIN DAILY PO) Take  by mouth daily.     • nitroglycerin (NITROSTAT) 0.4 MG SL tablet 1 under the tongue as needed for angina, may repeat q5mins for up three doses 25 tablet 1   • omeprazole (priLOSEC) 40 MG capsule Take 40 mg by mouth Daily.     • Polyethylene Glycol 3350 granules Take   by mouth Daily As Needed.     • simvastatin (ZOCOR) 40 MG tablet Take 40 mg by mouth every night.       No current facility-administered medications for this visit.        Patient has no known allergies.    Past Medical History:   Diagnosis Date   • Abdominal aortic aneurysm (AAA) (CMS/[facility])    • Abrasion of arm, left 2014   • Anemia    • Anxiety disorder    • Atherosclerotic heart disease    • Bleeding ulcer    • CAD (coronary artery disease)     followed by Dr. Phelps for CAD 60% (R) internal and 3.8 ectasia of the ascending thoracic aorta.   • Carotid stenosis    • Esophageal stricture    • H/O eye surgery 2019   • History of MI (myocardial infarction)     s/p Cardiac Cath s/p Stent placement x1 Dr. Alan   • Hyperlipidemia    • Hypertension    • Hypothyroidism    • Osteoarthritis    • Osteoporosis    • Peripheral vascular disease (CMS/[facility])    • RLS (restless legs syndrome)        Social History     Socioeconomic History   • Marital status:      Spouse name: Not on file   • Number of children: 2   • Years of education: Not on file   • Highest education level: Not on file   Occupational History   • Occupation: RETIRED/[employer]   Tobacco Use   • Smoking status: Former Smoker     Packs/day: 0.50     Types: Cigarettes     Last attempt to quit:      Years since quittin.0   • Smokeless tobacco: Never Used   • Tobacco comment: SMOKED OVER 20 YEARS   Substance and Sexual Activity   • Alcohol use: No   • Drug use: No   Social History Narrative    LIVES WITH HER  IN [facility]       Family History   Problem Relation Age of Onset   • Pneumonia Mother    • Arthritis Mother    • Asthma Father    • Heart attack Brother        Review of Systems   Constitution: Negative for decreased appetite, diaphoresis and malaise/fatigue.   HENT: Negative for congestion, hoarse voice and nosebleeds.    Eyes: Positive for redness (not a new symptom) and visual disturbance (follows with eye doctor).  "  Cardiovascular: Negative for chest pain, leg swelling, near-syncope, orthopnea, palpitations and paroxysmal nocturnal dyspnea.   Respiratory: Negative for cough, shortness of breath and sleep disturbances due to breathing.    Endocrine: Negative for polydipsia, polyphagia and polyuria.   Hematologic/Lymphatic: Negative for bleeding problem. Does not bruise/bleed easily.   Skin: Negative for flushing and rash.   Musculoskeletal: Negative for back pain, muscle cramps and muscle weakness.   Gastrointestinal: Negative for change in bowel habit, heartburn, melena and nausea.   Genitourinary: Negative for dysuria and hematuria.   Neurological: Negative for dizziness, headaches, loss of balance, numbness and paresthesias.   Psychiatric/Behavioral: The patient is nervous/anxious (reagarding family stress). The patient does not have insomnia.    Allergic/Immunologic: Positive for environmental allergies (at times).        Objective      Labs 10/14/2019: RBC 4.51, hemoglobin 12.7, hematocrit 40.3, platelets 239, glucose 92, BUN 15, creatinine 1, sodium 133, potassium 5.3, chloride 100, CO2 25, calcium 9.5, total protein 7, albumin 3.9, AST 29, ALT 21, , total bili 0.6, GFR 59, total cholesterol 152, triglycerides 169, HDL 56, LDL 62, TSH 0.17, vitamin D 27.6    /82 (BP Location: Left arm, Patient Position: Sitting, Cuff Size: Adult)   Pulse 59   Ht 167.6 cm (65.98\")   Wt 62.6 kg (138 lb)   SpO2 92%   BMI 22.28 kg/m²     Physical Exam   Constitutional: She is oriented to person, place, and time. She appears well-nourished.   HENT:   Head: Normocephalic.   Eyes: Pupils are equal, round, and reactive to light. Conjunctivae are normal.   Neck: Normal range of motion. Neck supple. Carotid bruit is present (right).   Cardiovascular: Normal rate, regular rhythm, S1 normal and S2 normal.   No murmur heard.  Pulmonary/Chest: Breath sounds normal. She has no wheezes. She has no rales.   Abdominal: Soft. Bowel sounds " are normal. She exhibits no distension. There is no tenderness.   Musculoskeletal: Normal range of motion. She exhibits no edema.   Neurological: She is alert and oriented to person, place, and time.   Skin: Skin is warm and dry.   Psychiatric: She has a normal mood and affect. Her behavior is normal.        Procedures: none today         Assessment/Plan      Dinah was seen today for follow-up, med refill and lab work.    Diagnoses and all orders for this visit:    Coronary artery disease involving native coronary artery of native heart without angina pectoris    Abdominal aortic aneurysm (AAA) without rupture (CMS/HCC)    Bilateral carotid artery stenosis    History of carotid endarterectomy    Essential hypertension    Hypercholesteremia      CAD-patient presents today without cardiac symptoms or concerns voiced.  She remains on daily low-dose aspirin without GI issues.  She admits to having Nitrostat if needed.  Her last stress test was in 2015.  At next visit we will consider repeat stress and echo due to her cardiac history and length of time from prior testing.  If she develops any symptoms of concern she understands to call or go to ED.     AAA-July 2019 ultrasound showed diameter of 3.7 cm.  Aneurysm remains stable at this time.  She also follows with Dr. Yap and has appointment to be seen in February of this year.     Carotid artery stenosis- July 2019 ultrasound showed less than 50% stenosis bilaterally.  Also followed by Dr. Yap.    HTN-blood pressure, heart rate, heart rhythm today are normal.  Continue Norvasc 5 mg daily, lisinopril 40 mg twice daily.  Thank you for sending copy of recent lab report.  Renal function labs are stable.    Hypercholesterolemia- recent lab report shows lipids controlled. Continue same dose Zocor. Please forward copy of next lab results.     Patient's Body mass index is 22.28 kg/m². BMI is within normal parameters. No follow-up required..     A 6-month follow-up visit  scheduled.  Please call sooner for any cardiac concerns.           Electronically signed by JULITO Mittal,  January 22, 2020 10:03 AM

## 2020-03-17 ENCOUNTER — TELEPHONE (OUTPATIENT)
Dept: CARDIAC SURGERY | Facility: CLINIC | Age: 80
End: 2020-03-17

## 2020-05-26 ENCOUNTER — OFFICE VISIT (OUTPATIENT)
Dept: CARDIAC SURGERY | Facility: CLINIC | Age: 80
End: 2020-05-26

## 2020-05-26 VITALS
TEMPERATURE: 98.2 F | BODY MASS INDEX: 22.02 KG/M2 | OXYGEN SATURATION: 90 % | WEIGHT: 137 LBS | SYSTOLIC BLOOD PRESSURE: 140 MMHG | DIASTOLIC BLOOD PRESSURE: 90 MMHG | HEART RATE: 67 BPM | HEIGHT: 66 IN

## 2020-05-26 DIAGNOSIS — I73.9 PVD (PERIPHERAL VASCULAR DISEASE) (HCC): ICD-10-CM

## 2020-05-26 DIAGNOSIS — I10 ESSENTIAL HYPERTENSION: ICD-10-CM

## 2020-05-26 DIAGNOSIS — I71.40 ABDOMINAL AORTIC ANEURYSM (AAA) WITHOUT RUPTURE (HCC): Primary | ICD-10-CM

## 2020-05-26 PROCEDURE — 99213 OFFICE O/P EST LOW 20 MIN: CPT | Performed by: NURSE PRACTITIONER

## 2020-05-26 NOTE — PROGRESS NOTES
[facility] Cardiothoracic Surgery Follow-Up Note    Name:  Dinah Gaines  MRN Number:  [id number]  Date of Encounter:  05/26/2020    Referred By:  No ref. provider found  PCP:  Grant Allen MD    Chief Complaint:    Chief Complaint   Patient presents with   • Follow-up     1 YR FU with U/S AAA       History of Present Illness:    Ms. Dinah Gaines is a pleasant 80 y.o. female with a history of hypertension, hyperlipidemia, coronary artery disease, carotid stenosis status post right CEA 3/2017 and peripheral vascular disease status post right iliac stent 3/2017 who returns the office today for postoperative exam.  The patient reports that she is doing well.  She denies claudication pain, visual disturbances, unilateral weakness, dysphagia and dysarthria.  Otherwise, she is doing well.    Review of Systems:  Review of Systems   Constitution: Negative for chills, fever, malaise/fatigue, night sweats and weight loss.   HENT: Negative for hearing loss, odynophagia and sore throat.    Cardiovascular: Negative for chest pain, dyspnea on exertion, leg swelling, orthopnea and palpitations.   Respiratory: Negative for cough and hemoptysis.    Endocrine: Negative for cold intolerance, heat intolerance, polydipsia, polyphagia and polyuria.   Hematologic/Lymphatic: Does not bruise/bleed easily.   Skin: Negative for itching and rash.   Musculoskeletal: Negative for joint pain, joint swelling and myalgias.   Gastrointestinal: Negative for abdominal pain, constipation, diarrhea, hematemesis, hematochezia, melena, nausea and vomiting.   Genitourinary: Positive for urgency. Negative for dysuria, frequency and hematuria.   Neurological: Negative for focal weakness, headaches, numbness and seizures.   Psychiatric/Behavioral: Negative for suicidal ideas.   All other systems reviewed and are negative.      Past Medical History:    Past Medical History:   Diagnosis Date   • Abdominal aortic aneurysm (AAA)  (CMS/[facility])    • Abrasion of arm, left 09/2014   • Anemia    • Anxiety disorder    • Atherosclerotic heart disease    • Bleeding ulcer    • CAD (coronary artery disease)     followed by Dr. Phelps for CAD 60% (R) internal and 3.8 ectasia of the ascending thoracic aorta.   • Carotid stenosis    • Esophageal stricture    • H/O eye surgery 07/2019   • History of MI (myocardial infarction) 2011    s/p Cardiac Cath s/p Stent placement x1 Dr. Alan   • Hyperlipidemia    • Hypertension    • Hypothyroidism    • Osteoarthritis    • Osteoporosis    • Peripheral vascular disease (CMS/[facility])    • RLS (restless legs syndrome)        Past Surgical History:    Past Surgical History:   Procedure Laterality Date   • CARDIAC CATHETERIZATION  04/04/2011    - 3.5x28 Promus in RCA. EF 45%   • CARDIOVASCULAR STRESS TEST  05/07/2015    Stress 4 min 10 sec, 68% THR, 180/100, negative for ischemia   • CAROTID ENDARTERECTOMY  03/31/2017    Right CEA with patch.   • CONVERTED (HISTORICAL) HOLTER  07/18/2013    Holter- baseline sinus, lowest HR 43 bpm   • CONVERTED (HISTORICAL) HOLTER  06/11/2014    Holter- borderline sinus, lowest HR 52bpm, frequent PVCs noted   • ECHO - CONVERTED  05/07/2015    Echo- EF 50-55%, RVSP 42 mmHg, mild MR   • ECHO - CONVERTED  03/28/2017    EF 60%, mild MR, RSVP 35   • OTHER SURGICAL HISTORY  03/01/2017    CTA with runoff:  3.6 cm AAA, right iliac 50-60%, R femoral 80% stenosis   • OTHER SURGICAL HISTORY  03/14/2017    CTA neck:  80% LINWOOD   • OTHER SURGICAL HISTORY  03/17/2017    Stent to right iliac and angioplasty   • OTHER SURGICAL HISTORY  07/10/2018    US of AAA:  3.7cm infrarenal AAA   • OTHER SURGICAL HISTORY  07/10/2018    CTA abdomen with runoff:  Multiple areas of stenosis, 80% popliteal right, 60% posterior tibialis   • OTHER SURGICAL HISTORY  07/16/2019    Carotid US- <50% bilaterally   • OTHER SURGICAL HISTORY  07/16/2019    Abd US- 3.7 cm aneurysm   • US CAROTID UNILATERAL  03/01/2017    Conor:   60% [facility]       Patient Active Problem List   Diagnosis   • Coronary artery disease involving native coronary artery of native heart without angina pectoris   • Essential hypertension   • Hypercholesteremia   • PVD (peripheral vascular disease) (CMS/HCC)   • Cold hands and feet   • Acquired hypothyroidism   • History of carotid endarterectomy   • Abdominal aortic aneurysm (AAA) without rupture (CMS/HCC)     Social History     Tobacco Use   • Smoking status: Former Smoker     Packs/day: 0.50     Years: 25.00     Pack years: 12.50     Types: Cigarettes     Last attempt to quit:      Years since quittin.4   • Smokeless tobacco: Never Used   • Tobacco comment: SMOKED OVER 20 YEARS   Substance Use Topics   • Alcohol use: No   • Drug use: No     Family History   Problem Relation Age of Onset   • Pneumonia Mother    • Arthritis Mother    • Asthma Father    • Heart attack Brother        Medications:      Current Outpatient Medications:   •  amLODIPine (NORVASC) 5 MG tablet, Take 1 tablet by mouth Daily. Start with 1/2 tablet daily. Monitor bp., Disp: 30 tablet, Rfl: 11  •  aspirin 81 MG tablet, Take 1 tablet by mouth Daily., Disp: 30 tablet, Rfl: 0  •  citalopram (CeleXA) 20 MG tablet, Take 20 mg by mouth Daily., Disp: , Rfl:   •  clonazePAM (KlonoPIN) 1 MG tablet, Take 1 mg by mouth Daily., Disp: , Rfl:   •  levothyroxine (SYNTHROID, LEVOTHROID) 75 MCG tablet, Take 75 mcg by mouth daily., Disp: , Rfl:   •  lisinopril (PRINIVIL,ZESTRIL) 40 MG tablet, Take 40 mg by mouth 2 (Two) Times a Day., Disp: , Rfl:   •  nitroglycerin (NITROSTAT) 0.4 MG SL tablet, 1 under the tongue as needed for angina, may repeat q5mins for up three doses, Disp: 25 tablet, Rfl: 1  •  omeprazole (priLOSEC) 40 MG capsule, Take 40 mg by mouth Daily., Disp: , Rfl:   •  simvastatin (ZOCOR) 40 MG tablet, Take 40 mg by mouth every night., Disp: , Rfl:   •  Multiple Vitamin (MULTI VITAMIN DAILY PO), Take  by mouth daily., Disp: , Rfl:   •   "Polyethylene Glycol 3350 granules, Take  by mouth Daily As Needed., Disp: , Rfl:     Allergies:  No Known Allergies    Physical Exam:  Vital Signs:    Vitals:    05/26/20 1057   BP: 140/90   BP Location: Left arm   Patient Position: Sitting   Pulse: 67   Temp: 98.2 °F (36.8 °C)   SpO2: 90%   Weight: 62.1 kg (137 lb)   Height: 167.6 cm (66\")       Physical Exam   Gen- NAD, pleasant, cooperative  CV- Regular rate and rhythm, no murmur, gallop or rub  Pulm- Clear to auscultation bilateral without wheeze or rhonchi  GI- Soft, normoactive bowel sounds, non-tender  Ext- Without edema  Neuro- CN II- XII grossly intact, tongue midline, voice normal.      Labs/Imaging:  US aorta Limited, [facility], 7/16/2019  Impression:  Stable ultrasound of the abdominal aorta.  The maximum diameter was approximately 3.7 cm    Bilateral carotid duplex, [facility], 7/16/2019  Right side: Common carotid artery was tortuous in its course through the neck but normal caliber.  Peak systolic and diastolic velocities are 55 and 8.4 cm/s.  At the carotid bifurcation, there was no stenosis in the internal carotid artery, it is tortuous in its course.  Peak systolic and diastolic velocities are 119 and 30 cm/s.  There is antegrade flow in the vertebral artery.    Left side: Common carotid artery was also tortuous in its course through the neck but normal caliber.  Peak systolic and diastolic velocities were 78 and 19 cm/s.  At the carotid bifurcation there is no stenosis identified in the internal carotid artery.  Peak systolic and diastolic velocities were 92 and 29 cm/s.  The vessel was tortuous in its course.  Vertebral artery flow is antegrade.    Impression:  Atherosclerotic changes noted involving both carotid systems.  The vessels are very tortuous.  The velocity measurements would indicate no stenosis greater than 50%.  Both vertebral arteries show antegrade flow.    Assessment / Plan:  Ms. Dinah Gaines is a pleasant 80 y.o. " female with a history of hypertension, hyperlipidemia, coronary artery disease, carotid stenosis status post right CEA 3/2017 and peripheral vascular disease status post right iliac stent 3/2017 who returns the office today for postoperative exam.  I have reviewed the patient's imaging with her in the office today.  There is been no significant change in the size of her abdominal aortic aneurysm and she demonstrates no significant carotid stenosis at this time.  At this time, I will plan to see the patient back in approximately one year with repeat AAA ultrasound and carotid duplex.  Should she have any questions or concerns in the interval, she may contact the office.    Please note, this document was produced using voice recognition software.    JULITO Mccann  [facility] Cardiothoracic Surgery

## 2020-05-29 ENCOUNTER — TELEPHONE (OUTPATIENT)
Dept: CARDIOLOGY | Facility: CLINIC | Age: 80
End: 2020-05-29

## 2020-05-29 DIAGNOSIS — I71.40 ABDOMINAL AORTIC ANEURYSM (AAA) WITHOUT RUPTURE (HCC): Primary | ICD-10-CM

## 2020-07-01 ENCOUNTER — HOSPITAL ENCOUNTER (OUTPATIENT)
Dept: CARDIOLOGY | Facility: HOSPITAL | Age: 80
Discharge: HOME | End: 2020-07-01
Admitting: NURSE PRACTITIONER

## 2020-07-01 DIAGNOSIS — I71.40 ABDOMINAL AORTIC ANEURYSM (AAA) WITHOUT RUPTURE (HCC): ICD-10-CM

## 2020-07-01 PROCEDURE — 76775 US EXAM ABDO BACK WALL LIM: CPT

## 2020-07-01 PROCEDURE — 76775 US EXAM ABDO BACK WALL LIM: CPT | Performed by: RADIOLOGY

## 2020-07-15 RX ORDER — AMLODIPINE BESYLATE 5 MG/1
TABLET ORAL
Qty: 30 TABLET | Refills: 11 | Status: SHIPPED | OUTPATIENT
Start: 2020-07-15 | End: 2021-03-08 | Stop reason: SDUPTHER

## 2020-07-23 ENCOUNTER — OFFICE VISIT (OUTPATIENT)
Dept: CARDIOLOGY | Facility: CLINIC | Age: 80
End: 2020-07-23

## 2020-07-23 VITALS
SYSTOLIC BLOOD PRESSURE: 120 MMHG | WEIGHT: 138 LBS | HEIGHT: 66 IN | DIASTOLIC BLOOD PRESSURE: 76 MMHG | TEMPERATURE: 97.7 F | HEART RATE: 68 BPM | BODY MASS INDEX: 22.18 KG/M2

## 2020-07-23 DIAGNOSIS — I10 ESSENTIAL HYPERTENSION: ICD-10-CM

## 2020-07-23 DIAGNOSIS — E78.00 HYPERCHOLESTEREMIA: ICD-10-CM

## 2020-07-23 DIAGNOSIS — I71.40 ABDOMINAL AORTIC ANEURYSM (AAA) WITHOUT RUPTURE (HCC): ICD-10-CM

## 2020-07-23 DIAGNOSIS — I25.10 CORONARY ARTERY DISEASE INVOLVING NATIVE CORONARY ARTERY OF NATIVE HEART WITHOUT ANGINA PECTORIS: Primary | ICD-10-CM

## 2020-07-23 DIAGNOSIS — I73.9 PVD (PERIPHERAL VASCULAR DISEASE) (HCC): ICD-10-CM

## 2020-07-23 DIAGNOSIS — Z98.890 HISTORY OF CAROTID ENDARTERECTOMY: ICD-10-CM

## 2020-07-23 PROCEDURE — 99214 OFFICE O/P EST MOD 30 MIN: CPT | Performed by: NURSE PRACTITIONER

## 2020-07-23 RX ORDER — OMEPRAZOLE 20 MG/1
20 CAPSULE, DELAYED RELEASE ORAL DAILY
COMMUNITY

## 2020-07-23 RX ORDER — SIMVASTATIN 40 MG
40 TABLET ORAL NIGHTLY
COMMUNITY
End: 2021-05-28 | Stop reason: ALTCHOICE

## 2020-07-23 NOTE — PROGRESS NOTES
Chief Complaint   Patient presents with   • Follow-up     For cardiac management. Patient is on aspirin. Last lab work was done on 10/14/19 per PCP, in chart under lab. States that she is always short of breath. States that she occasionally has palpitations if she gets really tired.   • Med Refill     Does not need refills at this time. Brought medication list with visit.        Cardiac Complaints  palpitations      Subjective   Dinah Gaines is a 80 y.o. female with HTN, hyperlipidemia, AAA, PVD, and  IHD diagnosed in 2011 when she underwent stenting of the RCA. Stress in 2015 showed no ischemia.  In 2017, she c/o cold hands and feet. KILEY was abnormal. CTA of abdomen with runoff showed 80% stenosis of right femoral which was stented by Dr. Heart.  CTA of her neck showed 80% lesion of LINWOOD for which she underwent (R)CEA with patch.  She developed GI bleeding in November 2017 and was hospitalized found to have duodenal ulcer treated with IV PPI. Dr. Franks recommended stopping Plavix since her GI bleeding risk was high and stenting was more than 6 months prior.  She was incidentally noted to have a 3.6 cm infrarenal AAA.  Soon after discharge she had a syncopal episode noted to be hypotensive.  Clonidine and lisinopril decreased, then clonidine stopped.      At last office visit carotid ultrasounds and abdominal aortic ultrasounds ordered.  On 7/16/2019 carotid ultrasound showed plaque involving both carotid arteries with stenosis less than 50% bilaterally.  Abdominal aneurysm showed max diameter of 3.7 cm.  Most recent AAA on 5/2020 showed slight enlargement at 4.1cm. Labs done 10/2019 shows:  HH 12.7/40.3, GLU 92, BUN 15, Creatinine 1.0, Na 133, K 5.3, AST 29, ALT 21, GFR 59, TRIG 169, HDL 56, LDL 62, TSH 0.17, VIT D 27.6.      Patient returns today for follow up and denies any new concerns. She does report some issues with palpitations that she reports as rare. She always reports shortness of breath but  admits this has been the same for many years and is no worse than before. Chest pain, palpitations, claudication, and syncope denied. No refills needed.             Cardiac History  Past Surgical History:   Procedure Laterality Date   • CARDIAC CATHETERIZATION  04/04/2011    - 3.5x28 Promus in RCA. EF 45%   • CARDIOVASCULAR STRESS TEST  05/07/2015    Stress 4 min 10 sec, 68% THR, 180/100, negative for ischemia   • CAROTID ENDARTERECTOMY  03/31/2017    Right CEA with patch.   • CONVERTED (HISTORICAL) HOLTER  07/18/2013    Holter- baseline sinus, lowest HR 43 bpm   • CONVERTED (HISTORICAL) HOLTER  06/11/2014    Holter- borderline sinus, lowest HR 52bpm, frequent PVCs noted   • ECHO - CONVERTED  05/07/2015    Echo- EF 50-55%, RVSP 42 mmHg, mild MR   • ECHO - CONVERTED  03/28/2017    EF 60%, mild MR, RSVP 35   • OTHER SURGICAL HISTORY  03/01/2017    CTA with runoff:  3.6 cm AAA, right iliac 50-60%, R femoral 80% stenosis   • OTHER SURGICAL HISTORY  03/14/2017    CTA neck:  80% LINWOOD   • OTHER SURGICAL HISTORY  03/17/2017    Stent to right iliac and angioplasty   • OTHER SURGICAL HISTORY  07/10/2018    US of AAA:  3.7cm infrarenal AAA   • OTHER SURGICAL HISTORY  07/10/2018    CTA abdomen with runoff:  Multiple areas of stenosis, 80% popliteal right, 60% posterior tibialis   • OTHER SURGICAL HISTORY  07/16/2019    Carotid US- <50% bilaterally   • OTHER SURGICAL HISTORY  07/16/2019    Abd US- 3.7 cm aneurysm   • OTHER SURGICAL HISTORY  05/2020    US AAA:  AAA at 4.1cm   • US CAROTID UNILATERAL  03/01/2017    Conor:  60% LINWOOD       Current Outpatient Medications   Medication Sig Dispense Refill   • amLODIPine (NORVASC) 5 MG tablet TAKE 1 TABLET BY MOUTH DAILY. START WITH 1/2 TABLET DAILY. *MONITOR BLOOD PRESSURE* **FOR BLOOD PRESSURE** (Patient taking differently: Take 2.5 mg by mouth Daily.) 30 tablet 11   • aspirin 81 MG tablet Take 1 tablet by mouth Daily. 30 tablet 0   • citalopram (CeleXA) 20 MG tablet Take 20 mg by  mouth Daily.     • clonazePAM (KlonoPIN) 1 MG tablet Take 1 mg by mouth Daily.     • levothyroxine (SYNTHROID, LEVOTHROID) 75 MCG tablet Take 75 mcg by mouth daily.     • lisinopril (PRINIVIL,ZESTRIL) 40 MG tablet Take 40 mg by mouth 2 (Two) Times a Day.     • Multiple Vitamin (MULTI VITAMIN DAILY PO) Take  by mouth daily.     • nitroglycerin (NITROSTAT) 0.4 MG SL tablet 1 under the tongue as needed for angina, may repeat q5mins for up three doses 25 tablet 1   • omeprazole (priLOSEC) 20 MG capsule Take 20 mg by mouth Daily.     • Polyethylene Glycol 3350 granules Take  by mouth Daily As Needed.     • simvastatin (ZOCOR) 40 MG tablet Take 40 mg by mouth Every Night.       No current facility-administered medications for this visit.        Patient has no known allergies.    Past Medical History:   Diagnosis Date   • Abdominal aortic aneurysm (AAA) (CMS/[facility])    • Abrasion of arm, left 2014   • Anemia    • Anxiety disorder    • Atherosclerotic heart disease    • Bleeding ulcer    • CAD (coronary artery disease)     followed by Dr. Phelps for CAD 60% (R) internal and 3.8 ectasia of the ascending thoracic aorta.   • Carotid stenosis    • Esophageal stricture    • H/O eye surgery 2019   • History of MI (myocardial infarction)     s/p Cardiac Cath s/p Stent placement x1 Dr. Alan   • Hyperlipidemia    • Hypertension    • Hypothyroidism    • Osteoarthritis    • Osteoporosis    • Peripheral vascular disease (CMS/HCC)    • RLS (restless legs syndrome)        Social History     Socioeconomic History   • Marital status:      Spouse name: Not on file   • Number of children: 2   • Years of education: Not on file   • Highest education level: Not on file   Occupational History   • Occupation: RETIRED/[employer]   Tobacco Use   • Smoking status: Former Smoker     Packs/day: 0.50     Years: 25.00     Pack years: 12.50     Types: Cigarettes     Last attempt to quit:      Years since quittin.5   •  "Smokeless tobacco: Never Used   • Tobacco comment: SMOKED OVER 20 YEARS   Substance and Sexual Activity   • Alcohol use: No   • Drug use: No   Social History Narrative    LIVES WITH HER  IN [facility]       Family History   Problem Relation Age of Onset   • Pneumonia Mother    • Arthritis Mother    • Asthma Father    • Heart attack Brother        Review of Systems   Constitution: Negative for malaise/fatigue and night sweats.   Cardiovascular: Positive for palpitations. Negative for chest pain, claudication, dyspnea on exertion, irregular heartbeat, leg swelling, near-syncope, orthopnea and syncope.   Respiratory: Negative for cough, shortness of breath and wheezing.    Musculoskeletal: Positive for stiffness. Negative for back pain, joint pain and joint swelling.   Gastrointestinal: Negative for anorexia, heartburn, melena, nausea and vomiting.   Genitourinary: Negative for dysuria, hematuria, hesitancy and nocturia.   Neurological: Negative for dizziness, light-headedness and loss of balance.   Psychiatric/Behavioral: Negative for depression and memory loss. The patient is not nervous/anxious.            Objective     /76 (BP Location: Left arm)   Pulse 68   Temp 97.7 °F (36.5 °C)   Ht 167.6 cm (65.98\")   Wt 62.6 kg (138 lb)   BMI 22.28 kg/m²     Physical Exam   Constitutional: She is oriented to person, place, and time. She appears well-developed and well-nourished.   HENT:   Head: Normocephalic and atraumatic.   Eyes: Pupils are equal, round, and reactive to light. EOM are normal.   Neck: Normal range of motion. Neck supple.   Cardiovascular: Normal rate and regular rhythm.   Murmur heard.  Pulmonary/Chest: Effort normal and breath sounds normal.   Abdominal: Soft.   Musculoskeletal: Normal range of motion.   Neurological: She is alert and oriented to person, place, and time.   Skin: Skin is warm and dry.   Psychiatric: She has a normal mood and affect. Her behavior is normal. "       Procedures    Assessment/Plan     IHD:  Status stable. No new concerns are voiced. She does admit to continued ASA use and denies any bleeding or bruising in regards.     HTN:  Blood pressure stable. No changes to current lisinopril or norvasc therapy recommended. Limited sodium intake advised.    AAA:  Most recent US showed enlargement at 4.1cm up from 3.5cm from prior.  Repeat US will be advised after next appt. She was urged on tight blood pressure control as well as lipid management.    Hyperlipidemia:  On statin therapy with zocor. She tolerates well. Most recent FLP showed lipid panel stable. No adjustment encouraged.    PVD:  No claudication or open wound reported. She will continue on ASA and tight cholesterol control.    BMI noted at 22.28, continued cardiac diet with limited caloric intake, carbs, and activity as tolerated advised.    6 month follow up recommended or sooner if needed.    No refills needed.          Problems Addressed this Visit        Cardiovascular and Mediastinum    Coronary artery disease involving native coronary artery of native heart without angina pectoris - Primary    Essential hypertension    Hypercholesteremia    Relevant Medications    simvastatin (ZOCOR) 40 MG tablet    PVD (peripheral vascular disease) (CMS/HCC)    Abdominal aortic aneurysm (AAA) without rupture (CMS/HCC)       Other    History of carotid endarterectomy          Patient's Body mass index is 22.28 kg/m². BMI is within normal parameters. No follow-up required..              Electronically signed by JULITO Hill July 23, 2020 17:25

## 2021-03-08 ENCOUNTER — OFFICE VISIT (OUTPATIENT)
Dept: CARDIOLOGY | Facility: CLINIC | Age: 81
End: 2021-03-08

## 2021-03-08 VITALS
BODY MASS INDEX: 22.14 KG/M2 | WEIGHT: 137.8 LBS | HEART RATE: 60 BPM | HEIGHT: 66 IN | DIASTOLIC BLOOD PRESSURE: 70 MMHG | SYSTOLIC BLOOD PRESSURE: 110 MMHG

## 2021-03-08 DIAGNOSIS — I71.40 ABDOMINAL AORTIC ANEURYSM (AAA) WITHOUT RUPTURE (HCC): Primary | ICD-10-CM

## 2021-03-08 DIAGNOSIS — E78.00 HYPERCHOLESTEREMIA: ICD-10-CM

## 2021-03-08 DIAGNOSIS — R42 DIZZINESS: ICD-10-CM

## 2021-03-08 DIAGNOSIS — I10 ESSENTIAL HYPERTENSION: ICD-10-CM

## 2021-03-08 DIAGNOSIS — Z98.890 HISTORY OF CAROTID ENDARTERECTOMY: ICD-10-CM

## 2021-03-08 PROCEDURE — 99214 OFFICE O/P EST MOD 30 MIN: CPT | Performed by: NURSE PRACTITIONER

## 2021-03-08 RX ORDER — AMLODIPINE BESYLATE 2.5 MG/1
2.5 TABLET ORAL DAILY
Qty: 90 TABLET | Refills: 3 | Status: SHIPPED | OUTPATIENT
Start: 2021-03-08

## 2021-03-08 RX ORDER — NITROGLYCERIN 0.4 MG/1
TABLET SUBLINGUAL
Qty: 25 TABLET | Refills: 1 | Status: SHIPPED | OUTPATIENT
Start: 2021-03-08

## 2021-03-08 NOTE — PROGRESS NOTES
Chief Complaint   Patient presents with   • Follow-up     Cardiac management.   • Lab     No current labs.   • Med Refill     Needs refills on Amlodipine-90 day.     Subjective       Dinah Gaines is a 80 y.o. female with HTN, hyperlipidemia, AAA, PVD, and  IHD diagnosed in 2011 when she underwent stenting of the RCA. Stress in 2015 showed no ischemia.  In 2017, she c/o cold hands and feet. KILEY was abnormal. CTA of abdomen with runoff showed 80% stenosis of right femoral which was stented by Dr. Heart.  CTA of her neck showed 80% lesion of LINWOOD for which she underwent (R)CEA with patch.  She developed GI bleeding in November 2017 and was hospitalized found to have duodenal ulcer treated with IV PPI. Dr. Franks recommended stopping Plavix since her GI bleeding risk was high and stenting was more than 6 months prior.  She was incidentally noted to have a 3.6 cm infrarenal AAA.  Soon after discharge she had a syncopal episode noted to be hypotensive.  Clonidine and lisinopril decreased, then clonidine stopped. Carotid US 7/2019 showed <50% stenosis bilaterally. US AAA 7/2020 showed slight interval change to 4.1 cm.  Labs are followed by Dr. Allen Q6 months.     She returns today for follow up. Denies new or worsening cardiac symptoms. No chest pain, SOB, palpitations. She denies claudication pain. Denies back pain or abdominal pain. Blood pressure has been well controlled. In 2019, lipids excellent with LDL 62/HDL 56. She plans to have labs rechecked next visit with PCP.          Cardiac History:    Past Surgical History:   Procedure Laterality Date   • CARDIAC CATHETERIZATION  04/04/2011    - 3.5x28 Promus in RCA. EF 45%   • CARDIOVASCULAR STRESS TEST  05/07/2015    Stress 4 min 10 sec, 68% THR, 180/100, negative for ischemia   • CAROTID ENDARTERECTOMY  03/31/2017    Right CEA with patch.   • CONVERTED (HISTORICAL) HOLTER  07/18/2013    Holter- baseline sinus, lowest HR 43 bpm   • CONVERTED (HISTORICAL)  HOLTER  06/11/2014    Holter- borderline sinus, lowest HR 52bpm, frequent PVCs noted   • ECHO - CONVERTED  05/07/2015    Echo- EF 50-55%, RVSP 42 mmHg, mild MR   • ECHO - CONVERTED  03/28/2017    EF 60%, mild MR, RSVP 35   • OTHER SURGICAL HISTORY  03/01/2017    CTA with runoff:  3.6 cm AAA, right iliac 50-60%, R femoral 80% stenosis   • OTHER SURGICAL HISTORY  03/14/2017    CTA neck:  80% LINWOOD   • OTHER SURGICAL HISTORY  03/17/2017    Stent to right iliac and angioplasty   • OTHER SURGICAL HISTORY  07/10/2018    US of AAA:  3.7cm infrarenal AAA   • OTHER SURGICAL HISTORY  07/10/2018    CTA abdomen with runoff:  Multiple areas of stenosis, 80% popliteal right, 60% posterior tibialis   • OTHER SURGICAL HISTORY  07/16/2019    Carotid US- <50% bilaterally   • OTHER SURGICAL HISTORY  07/16/2019    Abd US- 3.7 cm aneurysm   • OTHER SURGICAL HISTORY  07/01/2020    US AAA:  AAA at 4.1cm   • US CAROTID UNILATERAL  03/01/2017    Conor:  60% LINWOOD     Current Outpatient Medications   Medication Sig Dispense Refill   • amLODIPine (NORVASC) 2.5 MG tablet Take 1 tablet by mouth Daily. 90 tablet 3   • aspirin 81 MG tablet Take 1 tablet by mouth Daily. 30 tablet 0   • citalopram (CeleXA) 20 MG tablet Take 20 mg by mouth Daily.     • clonazePAM (KlonoPIN) 1 MG tablet Take 1 mg by mouth Daily.     • levothyroxine (SYNTHROID, LEVOTHROID) 75 MCG tablet Take 75 mcg by mouth daily.     • lisinopril (PRINIVIL,ZESTRIL) 40 MG tablet Take 40 mg by mouth 2 (Two) Times a Day.     • Multiple Vitamin (MULTI VITAMIN DAILY PO) Take  by mouth daily.     • nitroglycerin (NITROSTAT) 0.4 MG SL tablet 1 under the tongue as needed for angina, may repeat q5mins for up three doses 25 tablet 1   • omeprazole (priLOSEC) 20 MG capsule Take 20 mg by mouth Daily.     • simvastatin (ZOCOR) 40 MG tablet Take 40 mg by mouth Every Night.       No current facility-administered medications for this visit.     Patient has no known allergies.    Past Medical History:    Diagnosis Date   • Abdominal aortic aneurysm (AAA) (CMS/[facility])    • Abrasion of arm, left 09/2014   • Anemia    • Anxiety disorder    • Atherosclerotic heart disease    • Bleeding ulcer    • CAD (coronary artery disease)     followed by Dr. Phelps for CAD 60% (R) internal and 3.8 ectasia of the ascending thoracic aorta.   • Carotid stenosis    • Esophageal stricture    • H/O eye surgery 07/2019   • History of MI (myocardial infarction) 2011    s/p Cardiac Cath s/p Stent placement x1 Dr. Alan   • Hyperlipidemia    • Hypertension    • Hypothyroidism    • Osteoarthritis    • Osteoporosis    • Peripheral vascular disease (CMS/[facility])    • RLS (restless legs syndrome)      Social History     Socioeconomic History   • Marital status:      Spouse name: Not on file   • Number of children: 2   • Years of education: Not on file   • Highest education level: Not on file   Tobacco Use   • Smoking status: Former Smoker     Packs/day: 0.50     Years: 25.00     Pack years: 12.50     Types: Cigarettes     Quit date: 2011     Years since quitting: 10.1   • Smokeless tobacco: Never Used   • Tobacco comment: SMOKED OVER 20 YEARS   Vaping Use   • Vaping Use: Never used   Substance and Sexual Activity   • Alcohol use: No   • Drug use: No     Family History   Problem Relation Age of Onset   • Pneumonia Mother    • Arthritis Mother    • Asthma Father    • Heart attack Brother      Review of Systems   Constitutional: Negative for decreased appetite, malaise/fatigue and weight gain.   HENT: Negative.    Eyes: Negative for blurred vision.   Cardiovascular: Negative for chest pain, dyspnea on exertion, leg swelling, palpitations and syncope.   Respiratory: Negative for shortness of breath and sleep disturbances due to breathing.    Endocrine: Negative.    Hematologic/Lymphatic: Negative for bleeding problem. Does not bruise/bleed easily.   Skin: Negative.    Musculoskeletal: Negative for falls and myalgias.   Gastrointestinal:  "Negative for abdominal pain, heartburn and melena.   Genitourinary: Negative for hematuria.   Neurological: Negative for dizziness and light-headedness.   Psychiatric/Behavioral: Negative for altered mental status.   Allergic/Immunologic: Negative.       Objective     /70 (BP Location: Left arm)   Pulse 60   Ht 167.6 cm (65.98\")   Wt 62.5 kg (137 lb 12.8 oz)   BMI 22.25 kg/m²     Vitals and nursing note reviewed.   Constitutional:       General: Not in acute distress.     Appearance: Well-developed. Not diaphoretic.   Eyes:      Pupils: Pupils are equal, round, and reactive to light.   HENT:      Head: Normocephalic.   Pulmonary:      Effort: Pulmonary effort is normal. No respiratory distress.      Breath sounds: Normal breath sounds.   Cardiovascular:      Normal rate. Regular rhythm.   Pulses:     Intact distal pulses.   Edema:     Peripheral edema absent.   Abdominal:      General: Bowel sounds are normal.      Palpations: Abdomen is soft.   Musculoskeletal: Normal range of motion.      Cervical back: Normal range of motion. Skin:     General: Skin is warm and dry.   Neurological:      Mental Status: Alert and oriented to person, place, and time.        Procedures          Problem List Items Addressed This Visit        Cardiac and Vasculature    Essential hypertension    Relevant Medications    amLODIPine (NORVASC) 2.5 MG tablet    Other Relevant Orders    US Carotid Bilateral    US Aorta Limited    Hypercholesteremia    Relevant Orders    US Carotid Bilateral    US Aorta Limited    History of carotid endarterectomy    Relevant Orders    US Carotid Bilateral    US Aorta Limited    Abdominal aortic aneurysm (AAA) without rupture (CMS/HCC) - Primary    Relevant Orders    US Carotid Bilateral    US Aorta Limited      Other Visit Diagnoses     Dizziness        Relevant Orders    US Carotid Bilateral         1. CAD- s/p stenting of RCA in 2011, stress test in 2015 negative for ischemia. She has no anginal " symptoms, prefers conservative management. If she develops angina, recommend repeat stress test.     2. Carotid artery stenosis- s/p LINWOOD endarterectomy 2017. Will repeat US for 2 year follow up. In 2019, <50% stenosis noted bilaterally. Continue aspirin, statin, antihypertensives.     3. PAD- s/p stenting to R femoral, stable without claudication. Continue asp, statin, encouraged regular walking.     4. Hypercholesterolemia- LDL at goal with simvastatin, tolerates well. Continue current plan.     5. HTN- well controlled. Refill sent for amlodipine 2.5 mg. Continue lisinopril.     US carotids and AAA due in June. She has requested we follow. Order placed. Recommendations to follow.     We will see her back in six months. No changes made today.     Patient's Body mass index is 22.25 kg/m². BMI is within normal parameters. No follow-up required..              Electronically signed by JULITO Kaplan,  March 8, 2021 15:56 EST

## 2021-03-15 ENCOUNTER — HOSPITAL ENCOUNTER (OUTPATIENT)
Dept: CARDIOLOGY | Facility: HOSPITAL | Age: 81
End: 2021-03-15

## 2021-03-15 ENCOUNTER — APPOINTMENT (OUTPATIENT)
Dept: CARDIOLOGY | Facility: HOSPITAL | Age: 81
End: 2021-03-15

## 2021-04-19 ENCOUNTER — TELEPHONE (OUTPATIENT)
Dept: CARDIAC SURGERY | Facility: CLINIC | Age: 81
End: 2021-04-19

## 2021-04-19 NOTE — TELEPHONE ENCOUNTER
PT CALLED AND STATED THAT  SHE RECEIVED A RECALL LETTER BUT SHE DOESN'T NEED TO COME BACK AND SEE DR SAMANO BECAUSE HER CARDIOLOGIST IS TAKING CARE OF IT NOW.

## 2021-04-20 ENCOUNTER — TELEPHONE (OUTPATIENT)
Dept: CARDIOLOGY | Facility: CLINIC | Age: 81
End: 2021-04-20

## 2021-04-20 NOTE — TELEPHONE ENCOUNTER
"Patient called, extremely short of breath, states \"I just can't get any air, has been tired and weak for the last 2-3 days, this is the way I felt when I had to have stents placed\". While speaking with patient, she reports nothing is relieving shortness of breath.       Medications  Norvasc 2.5mg daily  Aspirin 81mg daily  Lisinopril 40mg bid  Simvastatin 40mg nightly  "

## 2021-05-28 ENCOUNTER — TELEPHONE (OUTPATIENT)
Dept: CARDIOLOGY | Facility: CLINIC | Age: 81
End: 2021-05-28

## 2021-05-28 RX ORDER — ATORVASTATIN CALCIUM 10 MG/1
10 TABLET, FILM COATED ORAL DAILY
Qty: 90 TABLET | Refills: 3 | Status: SHIPPED | OUTPATIENT
Start: 2021-05-28

## 2021-06-03 ENCOUNTER — HOSPITAL ENCOUNTER (OUTPATIENT)
Dept: CARDIOLOGY | Facility: HOSPITAL | Age: 81
Discharge: HOME | End: 2021-06-03

## 2021-06-03 DIAGNOSIS — I71.40 ABDOMINAL AORTIC ANEURYSM (AAA) WITHOUT RUPTURE (HCC): ICD-10-CM

## 2021-06-03 DIAGNOSIS — I10 ESSENTIAL HYPERTENSION: ICD-10-CM

## 2021-06-03 DIAGNOSIS — E78.00 HYPERCHOLESTEREMIA: ICD-10-CM

## 2021-06-03 DIAGNOSIS — Z98.890 HISTORY OF CAROTID ENDARTERECTOMY: ICD-10-CM

## 2021-06-03 DIAGNOSIS — R42 DIZZINESS: ICD-10-CM

## 2021-06-03 PROCEDURE — 76775 US EXAM ABDO BACK WALL LIM: CPT | Performed by: RADIOLOGY

## 2021-06-03 PROCEDURE — 93880 EXTRACRANIAL BILAT STUDY: CPT

## 2021-06-03 PROCEDURE — 93880 EXTRACRANIAL BILAT STUDY: CPT | Performed by: RADIOLOGY

## 2021-06-03 PROCEDURE — 76775 US EXAM ABDO BACK WALL LIM: CPT

## 2021-06-04 ENCOUNTER — TELEPHONE (OUTPATIENT)
Dept: CARDIOLOGY | Facility: CLINIC | Age: 81
End: 2021-06-04

## 2021-06-04 NOTE — TELEPHONE ENCOUNTER
Gave Yariel at [facility] a verbal order for lipitor 10 mg daily. 90 tablets and 3 refills per Brandi Meeks's order on 05/28/21.

## 2023-07-24 ENCOUNTER — TELEPHONE (OUTPATIENT)
Dept: CARDIAC SURGERY | Facility: CLINIC | Age: 83
End: 2023-07-24
Payer: MEDICARE

## 2023-07-24 NOTE — TELEPHONE ENCOUNTER
Patient previously seen in 2020 for abdominal aortic aneurysm. She has been referred back for same diagnosis and will be seeing Dr. Yap on 8/22/23. ( Had recent u/s aorta at [facility].)   She is requesting clearance to fly to [place] in regards to her AAA.

## 2023-08-22 ENCOUNTER — OFFICE VISIT (OUTPATIENT)
Dept: CARDIAC SURGERY | Facility: CLINIC | Age: 83
End: 2023-08-22
Payer: MEDICARE

## 2023-08-22 VITALS
DIASTOLIC BLOOD PRESSURE: 82 MMHG | HEIGHT: 66 IN | OXYGEN SATURATION: 94 % | HEART RATE: 63 BPM | BODY MASS INDEX: 20.93 KG/M2 | TEMPERATURE: 98.6 F | WEIGHT: 130.2 LBS | SYSTOLIC BLOOD PRESSURE: 138 MMHG

## 2023-08-22 DIAGNOSIS — I71.43 INFRARENAL ABDOMINAL AORTIC ANEURYSM (AAA) WITHOUT RUPTURE: Primary | ICD-10-CM

## 2023-08-22 PROCEDURE — 1160F RVW MEDS BY RX/DR IN RCRD: CPT | Performed by: THORACIC SURGERY (CARDIOTHORACIC VASCULAR SURGERY)

## 2023-08-22 PROCEDURE — 3075F SYST BP GE 130 - 139MM HG: CPT | Performed by: THORACIC SURGERY (CARDIOTHORACIC VASCULAR SURGERY)

## 2023-08-22 PROCEDURE — 99203 OFFICE O/P NEW LOW 30 MIN: CPT | Performed by: THORACIC SURGERY (CARDIOTHORACIC VASCULAR SURGERY)

## 2023-08-22 PROCEDURE — 1159F MED LIST DOCD IN RCRD: CPT | Performed by: THORACIC SURGERY (CARDIOTHORACIC VASCULAR SURGERY)

## 2023-08-22 PROCEDURE — 3079F DIAST BP 80-89 MM HG: CPT | Performed by: THORACIC SURGERY (CARDIOTHORACIC VASCULAR SURGERY)

## 2023-08-22 RX ORDER — TEMAZEPAM 7.5 MG/1
7.5 CAPSULE ORAL NIGHTLY PRN
COMMUNITY

## 2023-08-22 RX ORDER — AMITRIPTYLINE HYDROCHLORIDE 25 MG/1
TABLET, FILM COATED ORAL
COMMUNITY
Start: 2023-08-14

## 2023-08-22 NOTE — PROGRESS NOTES
08/22/2023  Patient Information  Dinah Gaines                                                                                          [address]  [place] [address]   1940  'PCP/Referring Physician'  Dave Marroquin MD  [phone]  Dave Marroquin,*  [phone]  Chief Complaint   Patient presents with    Consult     Prev Pt last seen in 2020 for AAA referred back for same DX. Pt states that with exertion she does get tired easily. Denies any pain or SOB.        History of Present Illness: 83-year-old  female with a history of hypertension, hyperlipidemia, diabetes mellitus, carotid artery stenosis, coronary artery disease and previous tobacco abuse who presents with a known abdominal aortic aneurysm.  The patient denies abdominal pain, back pain or family history of aneurysms.  Overall, the patient is doing well and is without complaints.      Patient Active Problem List   Diagnosis    Coronary artery disease involving native coronary artery of native heart without angina pectoris    Essential hypertension    Hypercholesteremia    PVD (peripheral vascular disease)    Cold hands and feet    Acquired hypothyroidism    History of carotid endarterectomy    Abdominal aortic aneurysm (AAA) without rupture     Past Medical History:   Diagnosis Date    Abdominal aortic aneurysm (AAA)     Abrasion of arm, left 09/2014    Anemia     Anxiety disorder     Atherosclerotic heart disease     Bleeding ulcer     CAD (coronary artery disease)     followed by Dr. Phelps for CAD 60% (R) internal and 3.8 ectasia of the ascending thoracic aorta.    Carotid stenosis     Esophageal stricture     H/O eye surgery 07/2019    History of MI (myocardial infarction) 2011    s/p Cardiac Cath s/p Stent placement x1 Dr. Alan    Hyperlipidemia     Hypertension     Hypothyroidism     Osteoarthritis     Osteoporosis     Peripheral vascular disease     RLS (restless legs syndrome)      Past Surgical History:    Procedure Laterality Date    CARDIAC CATHETERIZATION  04/04/2011    - 3.5x28 Promus in RCA. EF 45%    CARDIOVASCULAR STRESS TEST  05/07/2015    Stress 4 min 10 sec, 68% THR, 180/100, negative for ischemia    CAROTID ENDARTERECTOMY  03/31/2017    Right CEA with patch.    CONVERTED (HISTORICAL) HOLTER  07/18/2013    Holter- baseline sinus, lowest HR 43 bpm    CONVERTED (HISTORICAL) HOLTER  06/11/2014    Holter- borderline sinus, lowest HR 52bpm, frequent PVCs noted    ECHO - CONVERTED  05/07/2015    Echo- EF 50-55%, RVSP 42 mmHg, mild MR    ECHO - CONVERTED  03/28/2017    EF 60%, mild MR, RSVP 35    OTHER SURGICAL HISTORY  03/01/2017    CTA with runoff:  3.6 cm AAA, right iliac 50-60%, R femoral 80% stenosis    OTHER SURGICAL HISTORY  03/14/2017    CTA neck:  80% LINWOOD    OTHER SURGICAL HISTORY  03/17/2017    Stent to right iliac and angioplasty    OTHER SURGICAL HISTORY  07/10/2018    US of AAA:  3.7cm infrarenal AAA    OTHER SURGICAL HISTORY  07/10/2018    CTA abdomen with runoff:  Multiple areas of stenosis, 80% popliteal right, 60% posterior tibialis    OTHER SURGICAL HISTORY  07/16/2019    Carotid US- <50% bilaterally    OTHER SURGICAL HISTORY  07/16/2019    Abd US- 3.7 cm aneurysm    OTHER SURGICAL HISTORY  07/01/2020    US AAA:  AAA at 4.1cm    US CAROTID UNILATERAL  03/01/2017    Conor:  60% LINWOOD       Current Outpatient Medications:     amitriptyline (ELAVIL) 25 MG tablet, , Disp: , Rfl:     amLODIPine (NORVASC) 2.5 MG tablet, Take 1 tablet by mouth Daily., Disp: 90 tablet, Rfl: 3    atorvastatin (LIPITOR) 10 MG tablet, Take 1 tablet by mouth Daily. Stop simvastatin., Disp: 90 tablet, Rfl: 3    levothyroxine (SYNTHROID, LEVOTHROID) 75 MCG tablet, Take 1 tablet by mouth Daily., Disp: , Rfl:     Multiple Vitamin (MULTI VITAMIN DAILY PO), Take  by mouth daily., Disp: , Rfl:     temazepam (RESTORIL) 7.5 MG capsule, Take 1 capsule by mouth At Night As Needed for Sleep., Disp: , Rfl:     aspirin 81 MG tablet,  Take 1 tablet by mouth Daily. (Patient not taking: Reported on 2023), Disp: 30 tablet, Rfl: 0    citalopram (CeleXA) 20 MG tablet, Take 20 mg by mouth Daily. (Patient not taking: Reported on 2023), Disp: , Rfl:     clonazePAM (KlonoPIN) 1 MG tablet, Take 1 mg by mouth Daily. (Patient not taking: Reported on 2023), Disp: , Rfl:     lisinopril (PRINIVIL,ZESTRIL) 40 MG tablet, Take 40 mg by mouth 2 (Two) Times a Day. (Patient not taking: Reported on 2023), Disp: , Rfl:     nitroglycerin (NITROSTAT) 0.4 MG SL tablet, 1 under the tongue as needed for angina, may repeat q5mins for up three doses (Patient not taking: Reported on 2023), Disp: 25 tablet, Rfl: 1    omeprazole (priLOSEC) 20 MG capsule, Take 20 mg by mouth Daily. (Patient not taking: Reported on 2023), Disp: , Rfl:   Allergies   Allergen Reactions    Sulfa Antibiotics GI Intolerance     Pt states that this medication makes her deathly ill.      Social History     Socioeconomic History    Marital status:     Number of children: 2   Tobacco Use    Smoking status: Former     Packs/day: 0.50     Years: 25.00     Pack years: 12.50     Types: Cigarettes     Quit date:      Years since quittin.6    Smokeless tobacco: Never    Tobacco comments:     SMOKED OVER 20 YEARS   Vaping Use    Vaping Use: Never used   Substance and Sexual Activity    Alcohol use: No    Drug use: No    Sexual activity: Defer     Family History   Problem Relation Age of Onset    Pneumonia Mother     Arthritis Mother     Asthma Father     Heart attack Brother      Review of Systems   Constitutional: Positive for malaise/fatigue (great exertion). Negative for chills, decreased appetite, fever, weight gain and weight loss.   HENT:  Negative for hearing loss.         Trouble swallowing when eating bread   Eyes:  Positive for vision loss in right eye.   Cardiovascular:  Positive for dyspnea on exertion (related to age per pt) and leg swelling (bilat feet).  "Negative for chest pain, claudication, cyanosis, irregular heartbeat, near-syncope, orthopnea, palpitations, paroxysmal nocturnal dyspnea and syncope.   Endocrine: Negative for polydipsia, polyphagia and polyuria.   Hematologic/Lymphatic: Positive for bleeding problem. Negative for adenopathy. Bruises/bleeds easily.   Skin:  Positive for poor wound healing.   Musculoskeletal:  Negative for arthritis, falls, gout, joint pain, joint swelling, muscle weakness and myalgias.   Gastrointestinal:  Negative for abdominal pain, anorexia, dysphagia, heartburn, nausea and vomiting.   Genitourinary:  Negative for dysuria and hematuria.   Neurological:  Positive for loss of balance and weakness (sometimes). Negative for dizziness, focal weakness, headaches, numbness, seizures and vertigo.   Psychiatric/Behavioral:  Negative for altered mental status, depression, substance abuse and suicidal ideas. The patient is not nervous/anxious.    Allergic/Immunologic: Negative for HIV exposure and persistent infections.   Vitals:    08/22/23 0850   BP: 138/82   Pulse: 63   Temp: 98.6 øF (37 øC)   SpO2: 94%   Weight: 59.1 kg (130 lb 3.2 oz)   Height: 167.6 cm (66\")      Physical Exam  Vitals reviewed.   Constitutional:       General: She is not in acute distress.     Appearance: She is well-developed. She is not diaphoretic.      Comments: Pleasant  female who appears stated age and is present with her niece   HENT:      Head: Normocephalic and atraumatic.   Eyes:      General: No scleral icterus.     Conjunctiva/sclera: Conjunctivae normal.   Neck:      Vascular: No JVD.      Trachea: No tracheal deviation.   Cardiovascular:      Rate and Rhythm: Normal rate and regular rhythm.      Heart sounds: Normal heart sounds. No murmur heard.    No friction rub. No gallop.   Pulmonary:      Effort: Pulmonary effort is normal. No respiratory distress.      Breath sounds: Normal breath sounds. No wheezing or rales.   Abdominal:      General: " There is no distension.      Palpations: Abdomen is soft. There is no mass.      Tenderness: There is no abdominal tenderness. There is no guarding or rebound.   Musculoskeletal:         General: Normal range of motion.      Cervical back: Neck supple.   Skin:     General: Skin is warm and dry.      Findings: No erythema or rash.   Neurological:      Mental Status: She is alert and oriented to person, place, and time.   Psychiatric:         Behavior: Behavior normal.         Thought Content: Thought content normal.         Judgment: Judgment normal.       The ROS, past medical history, surgical history, family history, social history, and vitals were reviewed by myself and corrected as needed.      Labs/Imaging:  -Abdominal ultrasound performed 7/14/2023, per report (images unavailable from [facility]), demonstrates a 4.6 cm abdominal aortic aneurysm.  The bilateral iliac arteries measure 1.1 cm.    Assessment/Plan:  83-year-old  female with a history of hypertension, hyperlipidemia, diabetes mellitus, carotid artery stenosis, coronary artery disease and previous tobacco abuse who presents with a known abdominal aortic aneurysm.  I discussed with the patient the importance of strict blood pressure control in the setting of an abdominal aortic aneurysm and avoidance of heavy lifting.  A repeat abdominal ultrasound will be obtained in 1 year for continued surveillance.  I will have the patient return to clinic in 1 year to discuss the results of her repeat ultrasound.    Patient Active Problem List   Diagnosis    Coronary artery disease involving native coronary artery of native heart without angina pectoris    Essential hypertension    Hypercholesteremia    PVD (peripheral vascular disease)    Cold hands and feet    Acquired hypothyroidism    History of carotid endarterectomy    Abdominal aortic aneurysm (AAA) without rupture

## 2024-06-28 DIAGNOSIS — I71.43 INFRARENAL ABDOMINAL AORTIC ANEURYSM (AAA) WITHOUT RUPTURE: Primary | ICD-10-CM

## 2025-07-21 NOTE — PROGRESS NOTES
[facility] Cardioth[facility] New Patient Office Note     Date of Encounter: 2025     Name: Dinah Gaines  : 1940     Referred By: Dave Marroquin,*  PCP: Dave Marroquin MD    Chief Complaint:    Chief Complaint   Patient presents with    Lung Nodule     Np referred for a right lung nodule,complains of a cough and shortness of breath.       Subjective      History of Present Illness:    Dinah Gaines is a pleasant 85 y.o. female former smoker with history of COPD, HTN, HLD on statin therapy, carotid stenosis s/p remote right CEA, CAD s/p PCI, AAA, PAD s/p remote right iliac or femoral angioplasty/stent (data deficient), and right lung mass being referred to Dr. Yap by patient's PCP Dr. Dave Marroquin. She developed a nonproductive cough last month that she thought was a cold but didn't improve. She denies hemoptysis or constitutional. She has had some RANDHAWA and her PCP provided her some supplemental oxygen; no routine inhalers or neb treatments. She does not wish to be put to sleep.     Review of Systems:  Review of Systems   Constitutional: Negative. Negative for chills, decreased appetite, diaphoresis, fever, malaise/fatigue, night sweats, weight gain and weight loss.   HENT:  Negative for hoarse voice.    Eyes: Negative.  Negative for blurred vision, double vision and visual disturbance.   Cardiovascular:  Positive for dyspnea on exertion. Negative for chest pain, claudication, irregular heartbeat, leg swelling, near-syncope, orthopnea, palpitations, paroxysmal nocturnal dyspnea and syncope.   Respiratory:  Positive for cough. Negative for hemoptysis, shortness of breath, sputum production and wheezing.    Hematologic/Lymphatic: Negative.  Negative for adenopathy and bleeding problem. Does not bruise/bleed easily.   Skin: Negative.  Negative for color change, nail changes, poor wound healing and rash.   Musculoskeletal: Negative.  Negative for back pain, falls  and muscle cramps.   Gastrointestinal: Negative.  Negative for abdominal pain, dysphagia and heartburn.   Genitourinary: Negative.  Negative for flank pain.   Neurological: Negative.  Negative for brief paralysis, disturbances in coordination, dizziness, focal weakness, headaches, light-headedness, loss of balance, numbness, paresthesias, sensory change, vertigo and weakness.   Psychiatric/Behavioral: Negative.  Negative for depression and suicidal ideas.    Allergic/Immunologic: Negative.  Negative for persistent infections.       I have reviewed the following portions of the patient's history: problem list, current medications, allergies, past surgical history, past medical history, past social history, past family history, and ROS and confirm it's accurate.    Allergies:  Allergies   Allergen Reactions    Sulfa Antibiotics Itching and GI Intolerance     Pt states that this medication makes her deathly ill.        Medications:      Current Outpatient Medications:     furosemide (LASIX) 40 MG tablet, Take 1 tablet by mouth Daily., Disp: , Rfl:     NIFEdipine XL (PROCARDIA XL) 30 MG 24 hr tablet, Take 1 tablet by mouth Daily., Disp: , Rfl:     pantoprazole (PROTONIX) 40 MG EC tablet, Take 1 tablet by mouth Daily., Disp: , Rfl:     Thyroid 60 MG PO tablet, Take 1 tablet by mouth Daily., Disp: , Rfl:     traZODone (DESYREL) 100 MG tablet, Take 1 tablet by mouth Every Night., Disp: , Rfl:     valsartan (DIOVAN) 160 MG tablet, Take 2 tablets by mouth Daily., Disp: , Rfl:     History:   Past Medical History:   Diagnosis Date    Abdominal aortic aneurysm (AAA)     Abrasion of arm, left 09/2014    Anemia     Anxiety disorder     Atherosclerotic heart disease     Bleeding ulcer     CAD (coronary artery disease)     followed by Dr. Phelps for CAD 60% (R) internal and 3.8 ectasia of the ascending thoracic aorta.    Carotid stenosis     COVID-19 12/04/2023    Esophageal stricture     H/O eye surgery 07/2019    History of MI  (myocardial infarction) 2011    s/p Cardiac Cath s/p Stent placement x1 Dr. Alan    Hyperlipidemia     Hypertension     Hypothyroidism     Macular degeneration     Myocardial infarction     Osteoarthritis     Osteoporosis     Peripheral vascular disease     Pneumonia     RLS (restless legs syndrome)     Stenosis of right carotid artery 07/22/2025    Urethral caruncle 07/22/2025       Past Surgical History:   Procedure Laterality Date    CARDIAC CATHETERIZATION  04/04/2011    - 3.5x28 Promus in RCA. EF 45%    CARDIOVASCULAR STRESS TEST  05/07/2015    Stress 4 min 10 sec, 68% THR, 180/100, negative for ischemia    CAROTID ENDARTERECTOMY  03/31/2017    Right CEA with patch.    CONVERTED (HISTORICAL) HOLTER  07/18/2013    Holter- baseline sinus, lowest HR 43 bpm    CONVERTED (HISTORICAL) HOLTER  06/11/2014    Holter- borderline sinus, lowest HR 52bpm, frequent PVCs noted    CORONARY STENT PLACEMENT      ECHO - CONVERTED  05/07/2015    Echo- EF 50-55%, RVSP 42 mmHg, mild MR    ECHO - CONVERTED  03/28/2017    EF 60%, mild MR, RSVP 35    OTHER SURGICAL HISTORY  03/01/2017    CTA with runoff:  3.6 cm AAA, right iliac 50-60%, R femoral 80% stenosis    OTHER SURGICAL HISTORY  03/14/2017    CTA neck:  80% LINWOOD    OTHER SURGICAL HISTORY  03/17/2017    Stent to right iliac and angioplasty    OTHER SURGICAL HISTORY  07/10/2018    US of AAA:  3.7cm infrarenal AAA    OTHER SURGICAL HISTORY  07/10/2018    CTA abdomen with runoff:  Multiple areas of stenosis, 80% popliteal right, 60% posterior tibialis    OTHER SURGICAL HISTORY  07/16/2019    Carotid US- <50% bilaterally    OTHER SURGICAL HISTORY  07/16/2019    Abd US- 3.7 cm aneurysm    OTHER SURGICAL HISTORY  07/01/2020    US AAA:  AAA at 4.1cm    US CAROTID UNILATERAL  03/01/2017    Conor:  60% LINWOOD       Social History     Socioeconomic History    Marital status:     Number of children: 2   Tobacco Use    Smoking status: Former     Current packs/day: 0.00      "Average packs/day: 0.5 packs/day for 25.0 years (12.5 ttl pk-yrs)     Types: Cigarettes     Start date:      Quit date:      Years since quittin.5    Smokeless tobacco: Never    Tobacco comments:     SMOKED OVER 20 YEARS   Vaping Use    Vaping status: Never Used   Substance and Sexual Activity    Alcohol use: No    Drug use: No    Sexual activity: Defer        Family History   Problem Relation Age of Onset    Pneumonia Mother     Arthritis Mother     Asthma Father     Heart attack Brother        Objective   Physical Exam:  Vitals:    25 1402   BP: 163/94   BP Location: Right arm   Patient Position: Sitting   Pulse: 68   Temp: 97.8 °F (36.6 °C)   SpO2: 96%   Weight: 59.4 kg (131 lb)   Height: 167.6 cm (66\")  Comment: patient reports      Body mass index is 21.14 kg/m².    Physical Exam  Vitals and nursing note reviewed.   Constitutional:       Appearance: Normal appearance. She is well-developed.      Interventions: She is not intubated.  Neck:      Vascular: No carotid bruit.   Cardiovascular:      Rate and Rhythm: Normal rate and regular rhythm.      Pulses: Normal pulses.      Heart sounds: Normal heart sounds, S1 normal and S2 normal. No murmur heard.  Pulmonary:      Effort: Pulmonary effort is normal. No tachypnea, bradypnea, accessory muscle usage, prolonged expiration, respiratory distress or retractions. She is not intubated.      Breath sounds: Normal air entry. Rales present. No decreased breath sounds, wheezing or rhonchi.   Abdominal:      Palpations: Abdomen is soft.   Musculoskeletal:         General: No swelling.      Right lower leg: No edema.      Left lower leg: No edema.   Skin:     General: Skin is warm and dry.      Capillary Refill: Capillary refill takes less than 2 seconds.      Findings: No bruising.   Neurological:      General: No focal deficit present.      Mental Status: She is alert and oriented to person, place, and time. Mental status is at baseline.      GCS: GCS " eye subscore is 4. GCS verbal subscore is 5. GCS motor subscore is 6.      Motor: Motor function is intact.      Coordination: Coordination is intact.      Gait: Gait is intact.   Psychiatric:         Mood and Affect: Mood normal.         Speech: Speech normal.         Behavior: Behavior normal. Behavior is cooperative.         Cognition and Memory: Cognition normal.         Imaging/Labs:  EXTERNAL MEDICAL RECORDS - SCAN - CT CHEST W/ CONTRAST REPORT - [facility] (06/30/2025)       EXTERNAL MEDICAL RECORDS - SCAN - CHEST XRAY REPORT - [facility] (06/30/2025)       Assessment / Plan      Assessment / Plan:  Diagnoses and all orders for this visit:    1. Lung mass (Primary)    2. Former smoker       Initial referral to Dr Yap  Former smoker of 0.5PPD x 25 years. Quit more than 10 years ago  RANDHAWA and nonproductive cough, no hemoptysis. No constitutional symptoms  Exam benign, revealed relatively healthy and independent 85 year old ambulatory female  6/2025 CT chest personally reviewed with 3 x 3.5cm RLL peripheral lobular mass with central necrosis. No comparison imaging  We discuss differential which included infectious vs malignant etiologies. Given her smoking history this is concerning for malignancy   Recommend pursuing tissue sampling which she is agreeable to - RIGHT lung CT guided FNB with IR  Pt will require NM PET and MRI Brain for staging as well as PFTs     Follow Up:   Return in about 1 month (around 8/22/2025) for Pathology, NM PET, brain MRI, PFTs.   Or sooner for any further concerns or worsening sign and symptoms. If unable to reach us in the office please dial 911 or go to the nearest emergency department.      JULITO Ng  [facility] Cardiothor[facility] Surgery    Time Spent: I spent 28 minutes caring for Dinah on this date of service. This time includes time spent by me in the following activities: preparing for the visit, reviewing tests, obtaining and/or reviewing a separately obtained history,  performing a medically appropriate examination and/or evaluation, counseling and educating the patient/family/caregiver, ordering medications, tests, or procedures, referring and communicating with other health care professionals, documenting information in the medical record, independently interpreting results and communicating that information with the patient/family/caregiver, and care coordination.

## 2025-07-22 ENCOUNTER — OFFICE VISIT (OUTPATIENT)
Dept: CARDIAC SURGERY | Facility: CLINIC | Age: 85
End: 2025-07-22
Payer: MEDICARE

## 2025-07-22 VITALS
TEMPERATURE: 97.8 F | WEIGHT: 131 LBS | BODY MASS INDEX: 21.05 KG/M2 | SYSTOLIC BLOOD PRESSURE: 163 MMHG | OXYGEN SATURATION: 96 % | DIASTOLIC BLOOD PRESSURE: 94 MMHG | HEART RATE: 68 BPM | HEIGHT: 66 IN

## 2025-07-22 DIAGNOSIS — R91.8 LUNG MASS: Primary | ICD-10-CM

## 2025-07-22 DIAGNOSIS — Z87.891 FORMER SMOKER: ICD-10-CM

## 2025-07-22 PROBLEM — I77.9 PERIPHERAL ARTERIAL OCCLUSIVE DISEASE: Status: ACTIVE | Noted: 2025-07-22

## 2025-07-22 PROBLEM — J44.9 CHRONIC OBSTRUCTIVE PULMONARY DISEASE: Status: ACTIVE | Noted: 2025-07-22

## 2025-07-22 PROBLEM — K92.2 GASTROINTESTINAL HEMORRHAGE: Status: RESOLVED | Noted: 2025-07-22 | Resolved: 2025-07-22

## 2025-07-22 PROBLEM — E55.9 VITAMIN D DEFICIENCY: Status: ACTIVE | Noted: 2025-07-22

## 2025-07-22 PROBLEM — G37.9 DEMYELINATING DISEASE OF CENTRAL NERVOUS SYSTEM: Status: ACTIVE | Noted: 2025-07-22

## 2025-07-22 PROBLEM — E78.5 HYPERLIPIDEMIA: Status: ACTIVE | Noted: 2017-02-02

## 2025-07-22 PROBLEM — D50.9 IRON DEFICIENCY ANEMIA: Status: ACTIVE | Noted: 2025-07-22

## 2025-07-22 PROBLEM — N36.2 URETHRAL CARUNCLE: Status: RESOLVED | Noted: 2025-07-22 | Resolved: 2025-07-22

## 2025-07-22 PROBLEM — I65.21 STENOSIS OF RIGHT CAROTID ARTERY: Status: RESOLVED | Noted: 2025-07-22 | Resolved: 2025-07-22

## 2025-07-22 PROBLEM — I77.9 PERIPHERAL ARTERIAL OCCLUSIVE DISEASE: Status: RESOLVED | Noted: 2025-07-22 | Resolved: 2025-07-22

## 2025-07-22 PROBLEM — U07.1 COVID-19: Status: RESOLVED | Noted: 2023-12-04 | Resolved: 2025-07-22

## 2025-07-22 PROBLEM — F51.04 CHRONIC INSOMNIA: Status: ACTIVE | Noted: 2023-03-27

## 2025-07-22 PROBLEM — K27.9 PEPTIC ULCER: Status: ACTIVE | Noted: 2025-07-22

## 2025-07-22 PROCEDURE — 1160F RVW MEDS BY RX/DR IN RCRD: CPT | Performed by: NURSE PRACTITIONER

## 2025-07-22 PROCEDURE — 1159F MED LIST DOCD IN RCRD: CPT | Performed by: NURSE PRACTITIONER

## 2025-07-22 PROCEDURE — 99213 OFFICE O/P EST LOW 20 MIN: CPT | Performed by: NURSE PRACTITIONER

## 2025-07-22 RX ORDER — PANTOPRAZOLE SODIUM 40 MG/1
40 TABLET, DELAYED RELEASE ORAL DAILY
COMMUNITY

## 2025-07-22 RX ORDER — FUROSEMIDE 40 MG/1
40 TABLET ORAL DAILY
COMMUNITY

## 2025-07-22 RX ORDER — NIFEDIPINE 30 MG/1
30 TABLET, EXTENDED RELEASE ORAL DAILY
COMMUNITY

## 2025-07-22 RX ORDER — THYROID 60 MG/1
60 TABLET ORAL DAILY
COMMUNITY

## 2025-07-22 RX ORDER — TRAZODONE HYDROCHLORIDE 100 MG/1
100 TABLET ORAL NIGHTLY
COMMUNITY

## 2025-07-22 RX ORDER — VALSARTAN 160 MG/1
320 TABLET ORAL DAILY
COMMUNITY

## 2025-07-30 ENCOUNTER — HOSPITAL ENCOUNTER (OUTPATIENT)
Dept: PET IMAGING | Facility: HOSPITAL | Age: 85
Discharge: HOME | End: 2025-07-30
Admitting: NURSE PRACTITIONER
Payer: MEDICARE

## 2025-07-30 DIAGNOSIS — R91.8 LUNG MASS: ICD-10-CM

## 2025-07-30 PROCEDURE — 34310000005 FLUDEOXYGLUCOSE F18 SOLUTION: Performed by: NURSE PRACTITIONER

## 2025-07-30 PROCEDURE — A9552 F18 FDG: HCPCS | Performed by: NURSE PRACTITIONER

## 2025-07-30 PROCEDURE — 78815 PET IMAGE W/CT SKULL-THIGH: CPT

## 2025-07-30 RX ADMIN — FLUDEOXYGLUCOSE F 18 1 DOSE: 200 INJECTION, SOLUTION INTRAVENOUS at 14:07

## 2025-07-31 PROCEDURE — 78815 PET IMAGE W/CT SKULL-THIGH: CPT | Performed by: RADIOLOGY

## 2025-08-08 ENCOUNTER — TELEPHONE (OUTPATIENT)
Dept: INFUSION THERAPY | Facility: HOSPITAL | Age: 85
End: 2025-08-08
Payer: MEDICARE

## 2025-08-12 ENCOUNTER — HOSPITAL ENCOUNTER (OUTPATIENT)
Dept: CT IMAGING | Facility: HOSPITAL | Age: 85
Discharge: HOME | End: 2025-08-12
Payer: MEDICARE

## 2025-08-12 ENCOUNTER — HOSPITAL ENCOUNTER (OUTPATIENT)
Dept: GENERAL RADIOLOGY | Facility: HOSPITAL | Age: 85
Discharge: HOME | End: 2025-08-12
Payer: MEDICARE

## 2025-08-12 VITALS
WEIGHT: 131 LBS | TEMPERATURE: 96.8 F | BODY MASS INDEX: 21.05 KG/M2 | OXYGEN SATURATION: 94 % | SYSTOLIC BLOOD PRESSURE: 142 MMHG | RESPIRATION RATE: 20 BRPM | HEART RATE: 59 BPM | HEIGHT: 66 IN | DIASTOLIC BLOOD PRESSURE: 73 MMHG

## 2025-08-12 DIAGNOSIS — R91.8 LUNG MASS: ICD-10-CM

## 2025-08-12 LAB
BASOPHILS # BLD AUTO: 0.05 10*3/MM3 (ref 0–0.2)
BASOPHILS NFR BLD AUTO: 0.6 % (ref 0–1.5)
DEPRECATED RDW RBC AUTO: 46.9 FL (ref 37–54)
EOSINOPHIL # BLD AUTO: 0.35 10*3/MM3 (ref 0–0.4)
EOSINOPHIL NFR BLD AUTO: 4.4 % (ref 0.3–6.2)
ERYTHROCYTE [DISTWIDTH] IN BLOOD BY AUTOMATED COUNT: 13.2 % (ref 12.3–15.4)
HCT VFR BLD AUTO: 43.7 % (ref 34–46.6)
HGB BLD-MCNC: 13.3 G/DL (ref 12–15.9)
IMM GRANULOCYTES # BLD AUTO: 0.02 10*3/MM3 (ref 0–0.05)
IMM GRANULOCYTES NFR BLD AUTO: 0.3 % (ref 0–0.5)
INR PPP: 0.85 (ref 0.89–1.12)
LYMPHOCYTES # BLD AUTO: 1.59 10*3/MM3 (ref 0.7–3.1)
LYMPHOCYTES NFR BLD AUTO: 20.2 % (ref 19.6–45.3)
MCH RBC QN AUTO: 29.5 PG (ref 26.6–33)
MCHC RBC AUTO-ENTMCNC: 30.4 G/DL (ref 31.5–35.7)
MCV RBC AUTO: 96.9 FL (ref 79–97)
MONOCYTES # BLD AUTO: 0.48 10*3/MM3 (ref 0.1–0.9)
MONOCYTES NFR BLD AUTO: 6.1 % (ref 5–12)
NEUTROPHILS NFR BLD AUTO: 5.4 10*3/MM3 (ref 1.7–7)
NEUTROPHILS NFR BLD AUTO: 68.4 % (ref 42.7–76)
NRBC BLD AUTO-RTO: 0 /100 WBC (ref 0–0.2)
PLAT MORPH BLD: NORMAL
PLATELET # BLD AUTO: 191 10*3/MM3 (ref 140–450)
PMV BLD AUTO: 10.6 FL (ref 6–12)
PROTHROMBIN TIME: 12.1 SECONDS (ref 12.2–15.3)
RBC # BLD AUTO: 4.51 10*6/MM3 (ref 3.77–5.28)
RBC MORPH BLD: NORMAL
WBC MORPH BLD: NORMAL
WBC NRBC COR # BLD AUTO: 7.89 10*3/MM3 (ref 3.4–10.8)

## 2025-08-12 PROCEDURE — 25010000002 LIDOCAINE 1 % SOLUTION: Performed by: NURSE PRACTITIONER

## 2025-08-12 PROCEDURE — 88341 IMHCHEM/IMCYTCHM EA ADD ANTB: CPT | Performed by: NURSE PRACTITIONER

## 2025-08-12 PROCEDURE — 25010000002 MIDAZOLAM PER 1 MG: Performed by: STUDENT IN AN ORGANIZED HEALTH CARE EDUCATION/TRAINING PROGRAM

## 2025-08-12 PROCEDURE — 71045 X-RAY EXAM CHEST 1 VIEW: CPT

## 2025-08-12 PROCEDURE — 88307 TISSUE EXAM BY PATHOLOGIST: CPT | Performed by: NURSE PRACTITIONER

## 2025-08-12 PROCEDURE — 85007 BL SMEAR W/DIFF WBC COUNT: CPT | Performed by: STUDENT IN AN ORGANIZED HEALTH CARE EDUCATION/TRAINING PROGRAM

## 2025-08-12 PROCEDURE — 99152 MOD SED SAME PHYS/QHP 5/>YRS: CPT

## 2025-08-12 PROCEDURE — 85025 COMPLETE CBC W/AUTO DIFF WBC: CPT | Performed by: STUDENT IN AN ORGANIZED HEALTH CARE EDUCATION/TRAINING PROGRAM

## 2025-08-12 PROCEDURE — 85610 PROTHROMBIN TIME: CPT | Performed by: STUDENT IN AN ORGANIZED HEALTH CARE EDUCATION/TRAINING PROGRAM

## 2025-08-12 PROCEDURE — 88342 IMHCHEM/IMCYTCHM 1ST ANTB: CPT | Performed by: NURSE PRACTITIONER

## 2025-08-12 RX ORDER — FENTANYL CITRATE 50 UG/ML
INJECTION, SOLUTION INTRAMUSCULAR; INTRAVENOUS
Status: DISPENSED
Start: 2025-08-12 | End: 2025-08-12

## 2025-08-12 RX ORDER — MIDAZOLAM HYDROCHLORIDE 1 MG/ML
INJECTION, SOLUTION INTRAMUSCULAR; INTRAVENOUS
Status: DISPENSED
Start: 2025-08-12 | End: 2025-08-12

## 2025-08-12 RX ORDER — LIDOCAINE HYDROCHLORIDE 10 MG/ML
10 INJECTION, SOLUTION INFILTRATION; PERINEURAL ONCE
Status: COMPLETED | OUTPATIENT
Start: 2025-08-12 | End: 2025-08-12

## 2025-08-12 RX ORDER — SODIUM CHLORIDE 0.9 % (FLUSH) 0.9 %
10 SYRINGE (ML) INJECTION AS NEEDED
Status: DISCONTINUED | OUTPATIENT
Start: 2025-08-12 | End: 2025-08-13 | Stop reason: HOSPADM

## 2025-08-12 RX ORDER — MIDAZOLAM HYDROCHLORIDE 1 MG/ML
INJECTION, SOLUTION INTRAMUSCULAR; INTRAVENOUS AS NEEDED
Status: COMPLETED | OUTPATIENT
Start: 2025-08-12 | End: 2025-08-12

## 2025-08-12 RX ADMIN — MIDAZOLAM HYDROCHLORIDE 0.5 MG: 1 INJECTION, SOLUTION INTRAMUSCULAR; INTRAVENOUS at 10:36

## 2025-08-12 RX ADMIN — LIDOCAINE HYDROCHLORIDE 10 ML: 10 INJECTION, SOLUTION INFILTRATION; PERINEURAL at 11:09

## 2025-08-13 ENCOUNTER — TELEPHONE (OUTPATIENT)
Dept: INFUSION THERAPY | Facility: HOSPITAL | Age: 85
End: 2025-08-13
Payer: MEDICARE

## 2025-08-14 LAB
CYTO UR: NORMAL
LAB AP CASE REPORT: NORMAL
LAB AP CLINICAL INFORMATION: NORMAL
LAB AP DIAGNOSIS COMMENT: NORMAL
LAB AP SPECIAL STAINS: NORMAL
PATH REPORT.ADDENDUM SPEC: NORMAL
PATH REPORT.FINAL DX SPEC: NORMAL
PATH REPORT.GROSS SPEC: NORMAL

## 2025-08-20 ENCOUNTER — HOSPITAL ENCOUNTER (OUTPATIENT)
Dept: MRI IMAGING | Facility: HOSPITAL | Age: 85
Discharge: HOME | End: 2025-08-20
Payer: MEDICARE

## 2025-08-20 ENCOUNTER — HOSPITAL ENCOUNTER (OUTPATIENT)
Dept: RESPIRATORY THERAPY | Facility: HOSPITAL | Age: 85
Discharge: HOME | End: 2025-08-20
Payer: MEDICARE

## 2025-08-20 DIAGNOSIS — R91.8 LUNG MASS: ICD-10-CM

## 2025-08-20 PROCEDURE — 94729 DIFFUSING CAPACITY: CPT

## 2025-08-20 PROCEDURE — 94010 BREATHING CAPACITY TEST: CPT

## 2025-08-20 PROCEDURE — 70553 MRI BRAIN STEM W/O & W/DYE: CPT

## 2025-08-20 PROCEDURE — 25510000001 GADOPICLENOL 0.5 MMOL/ML SOLUTION: Performed by: NURSE PRACTITIONER

## 2025-08-20 PROCEDURE — 94726 PLETHYSMOGRAPHY LUNG VOLUMES: CPT

## 2025-08-20 PROCEDURE — A9573 GADOPICLENOL 0.5 MMOL/ML SOLUTION: HCPCS | Performed by: NURSE PRACTITIONER

## 2025-08-20 RX ADMIN — GADOPICLENOL 6 ML: 485.1 INJECTION INTRAVENOUS at 11:37

## 2025-08-26 ENCOUNTER — TELEPHONE (OUTPATIENT)
Dept: CARDIAC SURGERY | Facility: CLINIC | Age: 85
End: 2025-08-26